# Patient Record
Sex: MALE | Race: BLACK OR AFRICAN AMERICAN | Employment: FULL TIME | URBAN - METROPOLITAN AREA
[De-identification: names, ages, dates, MRNs, and addresses within clinical notes are randomized per-mention and may not be internally consistent; named-entity substitution may affect disease eponyms.]

---

## 2017-04-11 ENCOUNTER — HOSPITAL ENCOUNTER (EMERGENCY)
Age: 42
Discharge: HOME | End: 2017-04-11
Attending: FAMILY MEDICINE

## 2017-04-11 VITALS
TEMPERATURE: 98 F | OXYGEN SATURATION: 99 % | HEART RATE: 67 BPM | BODY MASS INDEX: 21.03 KG/M2 | RESPIRATION RATE: 16 BRPM | SYSTOLIC BLOOD PRESSURE: 138 MMHG | WEIGHT: 134 LBS | DIASTOLIC BLOOD PRESSURE: 74 MMHG | HEIGHT: 67 IN

## 2017-04-11 DIAGNOSIS — T14.8XXA NONHEALING NONSURGICAL WOUND: Primary | ICD-10-CM

## 2017-04-11 RX ORDER — MUPIROCIN 20 MG/G
OINTMENT TOPICAL 2 TIMES DAILY
Qty: 22 G | Refills: 0 | Status: SHIPPED | OUTPATIENT
Start: 2017-04-11 | End: 2018-10-16

## 2017-04-11 RX ORDER — CEPHALEXIN 500 MG/1
500 CAPSULE ORAL 4 TIMES DAILY
Qty: 28 CAP | Refills: 0 | Status: SHIPPED | OUTPATIENT
Start: 2017-04-11 | End: 2017-04-18

## 2017-04-11 NOTE — UC PROVIDER NOTE
HPI Comments: 43 yo male presents today with a nonhealing wound possibly from a insect bite. No hx of vascular disease or SCA. NO fever or chills. Patient is a 42 y.o. male presenting with Insect Bite. The history is provided by the patient. No  was used. Insect Bite   This is a new problem. Episode onset: 1 month. The problem occurs constantly. The problem has not changed since onset. Pertinent negatives include no chest pain, no abdominal pain, no headaches and no shortness of breath. Nothing aggravates the symptoms. Nothing relieves the symptoms. Treatments tried: Soap and water. The treatment provided no relief. Past Medical History:   Diagnosis Date    Bronchitis         History reviewed. No pertinent surgical history. History reviewed. No pertinent family history. Social History     Social History    Marital status: SINGLE     Spouse name: N/A    Number of children: N/A    Years of education: N/A     Occupational History    Not on file. Social History Main Topics    Smoking status: Current Every Day Smoker    Smokeless tobacco: Not on file      Comment: smokes cigars maybe one or two aday    Alcohol use Yes      Comment: occ    Drug use: No    Sexual activity: Not on file     Other Topics Concern    Not on file     Social History Narrative                ALLERGIES: Review of patient's allergies indicates no known allergies. Review of Systems   Constitutional: Negative. HENT: Negative. Eyes: Negative. Respiratory: Negative. Negative for shortness of breath. Cardiovascular: Negative. Negative for chest pain. Gastrointestinal: Negative. Negative for abdominal pain. Endocrine: Negative. Genitourinary: Negative. Musculoskeletal: Negative. Skin: Positive for wound. Allergic/Immunologic: Negative. Neurological: Negative. Negative for headaches. Hematological: Negative. Psychiatric/Behavioral: Negative.         Vitals: 04/11/17 0834   BP: 138/74   Pulse: 67   Resp: 16   Temp: 98 °F (36.7 °C)   SpO2: 99%   Weight: 60.8 kg (134 lb)   Height: 5' 7\" (1.702 m)       Physical Exam   Constitutional: He is oriented to person, place, and time. He appears well-developed and well-nourished. HENT:   Head: Normocephalic and atraumatic. Right Ear: External ear normal.   Left Ear: External ear normal.   Nose: Nose normal.   Mouth/Throat: Oropharynx is clear and moist.   Cardiovascular: Normal rate, regular rhythm and normal heart sounds. Pulmonary/Chest: Effort normal and breath sounds normal.   Musculoskeletal: Normal range of motion. Neurological: He is alert and oriented to person, place, and time. Skin: Skin is warm and dry. Nonhealing appearing wound to right medial lower leg. Brown crusty appearance. No altaf wound erythema or drainage  2+ DP/PT pulses   Psychiatric: He has a normal mood and affect. His behavior is normal. Judgment and thought content normal.   Nursing note and vitals reviewed. MDM     Differential Diagnosis; Clinical Impression; Plan:     CLINICAL IMPRESSION:  Nonhealing nonsurgical wound  (primary encounter diagnosis)    Plan:  1. Non healing wound to right lower leg. 2. Wash with soap and water daily- Dial. Apply thin layer of Bactroban Ointment twice a day for 7 days. Stop scratching as this makes it much worse. 3. Follow up with a PCP if no improvement. Risk of Significant Complications, Morbidity, and/or Mortality:   Presenting problems: Moderate  Diagnostic procedures:   Moderate  Progress:   Patient progress:  Stable      Procedures

## 2017-05-01 ENCOUNTER — OFFICE VISIT (OUTPATIENT)
Dept: INTERNAL MEDICINE CLINIC | Age: 42
End: 2017-05-01

## 2017-05-01 VITALS
HEART RATE: 61 BPM | WEIGHT: 135.9 LBS | SYSTOLIC BLOOD PRESSURE: 127 MMHG | OXYGEN SATURATION: 96 % | TEMPERATURE: 98.6 F | HEIGHT: 70 IN | DIASTOLIC BLOOD PRESSURE: 84 MMHG | RESPIRATION RATE: 18 BRPM | BODY MASS INDEX: 19.45 KG/M2

## 2017-05-01 DIAGNOSIS — Z72.0 TOBACCO ABUSE: ICD-10-CM

## 2017-05-01 DIAGNOSIS — L70.0 BLACKHEAD: ICD-10-CM

## 2017-05-01 DIAGNOSIS — R59.1 LYMPHADENOPATHY: ICD-10-CM

## 2017-05-01 DIAGNOSIS — R23.4 ESCHAR: ICD-10-CM

## 2017-05-01 DIAGNOSIS — Z00.00 PHYSICAL EXAM: Primary | ICD-10-CM

## 2017-05-01 NOTE — MR AVS SNAPSHOT
Visit Information Date & Time Provider Department Dept. Phone Encounter #  
 5/1/2017 11:00 AM Ruchi Gonzalez MD [facility] [phone] Follow-up Instructions Return in about 4 weeks (around 5/29/2017). Upcoming Health Maintenance Date Due INFLUENZA AGE 9 TO ADULT 8/1/2017 DTaP/Tdap/Td series (2 - Td) 5/1/2027 Allergies as of 5/1/2017  Review Complete On: 5/1/2017 By: Dennie Sermon No Known Allergies Current Immunizations  Never Reviewed No immunizations on file. Not reviewed this visit You Were Diagnosed With   
  
 Codes Comments Physical exam    -  Primary ICD-10-CM: Z00.00 ICD-9-CM: V70.9 Blackhead     ICD-10-CM: L70.0 ICD-9-CM: 706.1 Eschar     ICD-10-CM: R23.4 ICD-9-CM: 782.8 Lymphadenopathy     ICD-10-CM: R59.1 ICD-9-CM: 785.6 Tobacco abuse     ICD-10-CM: Z72.0 ICD-9-CM: 305.1 Vitals BP Pulse Temp Resp Height(growth percentile) Weight(growth percentile) 127/84 (BP 1 Location: Left arm, BP Patient Position: Sitting) 61 98.6 °F (37 °C) (Oral) 18 5' 10.08\" (1.78 m) 135 lb 14.4 oz (61.6 kg) SpO2 BMI Smoking Status 96% 19.46 kg/m2 Current Every Day Smoker BMI and BSA Data Body Mass Index Body Surface Area  
 19.46 kg/m 2 1.75 m 2 Preferred Pharmacy Pharmacy Name Phone [id number] [place], [address] [facility] 1 [phone] Your Updated Medication List  
  
   
This list is accurate as of: 5/1/17 12:51 PM.  Always use your most recent med list.  
  
  
  
  
 albuterol 90 mcg/actuation inhaler Commonly known as:  PROVENTIL HFA, VENTOLIN HFA, PROAIR HFA Take 2 Puffs by inhalation every four (4) hours as needed for Wheezing. mupirocin 2 % ointment Commonly known as:  [facility] Apply  to affected area two (2) times a day. Apply to area for 7 days We Performed the Following CBC WITH AUTOMATED DIFF [36972 CPT(R)] METABOLIC PANEL, COMPREHENSIVE [88473 CPT(R)] URINALYSIS W/ RFLX MICROSCOPIC [19543 CPT(R)] Follow-up Instructions Return in about 4 weeks (around 5/29/2017). Introducing [facility]! [employer] introduces [employer] patient portal. Now you can access parts of your medical record, email your doctor's office, and request medication refills online. 1. In your internet browser, go to [email or web address] 2. Click on the First Time User? Click Here link in the Sign In box. You will see the New Member Sign Up page. 3. Enter your [employer] Access Code exactly as it appears below. You will not need to use this code after youve completed the sign-up process. If you do not sign up before the expiration date, you must request a new code. · [employer] Access Code: [email or web address] Expires: 7/10/2017  8:22 AM 
 
4. Enter the last four digits of your Social Security Number (xxxx) and Date of Birth (mm/dd/yyyy) as indicated and click Submit. You will be taken to the next sign-up page. 5. Create a En[employer] ID. This will be your [employer] login ID and cannot be changed, so think of one that is secure and easy to remember. 6. Create a [employer] password. You can change your password at any time. 7. Enter your Password Reset Question and Answer. This can be used at a later time if you forget your password. 8. Enter your e-mail address. You will receive e-mail notification when new information is available in [address] 9. Click Sign Up. You can now view and download portions of your medical record. 10. Click the Download Summary menu link to download a portable copy of your medical information. If you have questions, please visit the Frequently Asked Questions section of the [employer] website. Remember, [employer] is NOT to be used for urgent needs. For medical emergencies, dial 911. Now available from your iPhone and Android! Please provide this summary of care documentation to your next provider. Your primary care clinician is listed as NONE. If you have any questions after today's visit, please call [phone].

## 2017-05-01 NOTE — PROGRESS NOTES
[address] [place] [address] and Primary Care  [place] [address]  [address] [place] [address]  Phone:  [phone]  Fax: [phone]       Chief Complaint   Patient presents with    New Patient     est. PCP   . SUBJECTIVE:    Juventino Urrutia is a 42 y.o. male comes in for a physical examination. He is concerned about a rash on his right foot. Apparently he felt he was bitten by some type of insect three months ago and went to the emergency room where he was given antibiotics and a topical preparation. The lesion has pretty much healed although it itches on occasion. He has a lesion on his right clavicle he is concerned about. There has been no increase in size. His weight has been quite stable. Unfortunately he smokes tobacco.             Current Outpatient Prescriptions   Medication Sig Dispense Refill    mupirocin (BACTROBAN) 2 % ointment Apply  to affected area two (2) times a day. Apply to area for 7 days 22 g 0    albuterol (PROVENTIL HFA, VENTOLIN HFA) 90 mcg/actuation inhaler Take 2 Puffs by inhalation every four (4) hours as needed for Wheezing. 1 Inhaler 0    ciprofloxacin HCl (CIPRO) 500 mg tablet Take 1 tab twice a day for 14 days 20 Tab 0     Past Medical History:   Diagnosis Date    Bronchitis      History reviewed. No pertinent surgical history.   No Known Allergies      REVIEW OF SYSTEMS:  General: negative for - chills or fever  ENT: negative for - headaches, nasal congestion or tinnitus  Respiratory: negative for - cough, hemoptysis, shortness of breath or wheezing  Cardiovascular : negative for - chest pain, edema, palpitations or shortness of breath  Gastrointestinal: negative for - abdominal pain, blood in stools, heartburn or nausea/vomiting  Genito-Urinary: no dysuria, trouble voiding, or hematuria  Musculoskeletal: negative for - gait disturbance, joint pain, joint stiffness or joint swelling  Neurological: no TIA or stroke symptoms  Hematologic: no bruises, no bleeding, no swollen glands  Integument: no lumps, mole changes, nail changes or rash  Endocrine: no malaise/lethargy or unexpected weight changes      Social History     Social History    Marital status: UNKNOWN     Spouse name: N/A    Number of children: N/A    Years of education: N/A     Social History Main Topics    Smoking status: Current Every Day Smoker    Smokeless tobacco: Never Used      Comment: smokes cigars maybe one or two aday    Alcohol use Yes      Comment: occ    Drug use: No    Sexual activity: Not Asked     Other Topics Concern    None     Social History Narrative     Family History   Problem Relation Age of Onset    Hypertension Mother        OBJECTIVE:    Visit Vitals    /84 (BP 1 Location: Left arm, BP Patient Position: Sitting)    Pulse 61    Temp 98.6 °F (37 °C) (Oral)    Resp 18    Ht 5' 10.08\" (1.78 m)    Wt 135 lb 14.4 oz (61.6 kg)    SpO2 96%    BMI 19.46 kg/m2     CONSTITUTIONAL: well , well nourished, appears age appropriate  EYES: perrla, eom intact  ENMT:moist mucous membranes, pharynx clear  NECK: supple. Thyroid normal  RESPIRATORY: Chest: clear to ascultation and percussion   CARDIOVASCULAR: Heart: regular rate and rhythm  GASTROINTESTINAL: Abdomen: soft, bowel sounds active  HEMATOLOGIC: Shotty lymph nodes cervical region only  MUSCULOSKELETAL: Extremities: no edema, pulse 1+   INTEGUMENT: No unusual rashes or suspicious skin lesions noted. Nails appear normal.  Blackhead back  NEUROLOGIC: non-focal exam   MENTAL STATUS: alert and oriented, appropriate affect      ASSESSMENT:  1. Physical exam    2. Blackhead    3. Eschar    4. Lymphadenopathy    5. Tobacco abuse        PLAN:    1. The lesion on his right ankle is an eschar at this point. Nothing need be done. He was told not to apply any topical preparation to this. 2. The lesion on his right clavicle is related to a blackhead. Nothing need be done with this.     3. He has multiple shoddy lymph nodes in the neck region along with one in both axilla. There was no significant inguinal lymphadenopathy. The lesions do not appear to be pathological from a topical standpoint. I will have him come back in a month for follow-up to assess the hopeful involution of the lymph nodes. 4. I encourage the patient to discontinue cigarette smoking. Follow-up Disposition:  Return in about 4 weeks (around 5/29/2017).       Gemma Link MD

## 2017-05-01 NOTE — PROGRESS NOTES
Chief Complaint   Patient presents with    New Patient     est. PCP   1. Have you been to the ER, urgent care clinic since your last visit? Hospitalized since your last visit? Yes Reason for visit: insect bite    2. Have you seen or consulted any other health care providers outside of the [address] [place] [address] since your last visit? Include any pap smears or colon screening.  No

## 2017-05-02 LAB
ALBUMIN SERPL-MCNC: 4.3 G/DL (ref 3.5–5.5)
ALBUMIN/GLOB SERPL: 2.2 {RATIO} (ref 1.2–2.2)
ALP SERPL-CCNC: 83 IU/L (ref 39–117)
ALT SERPL-CCNC: 18 IU/L (ref 0–44)
APPEARANCE UR: CLEAR
AST SERPL-CCNC: 29 IU/L (ref 0–40)
BACTERIA #/AREA URNS HPF: ABNORMAL /[HPF]
BASOPHILS # BLD AUTO: 0 X10E3/UL (ref 0–0.2)
BASOPHILS NFR BLD AUTO: 0 %
BILIRUB SERPL-MCNC: 0.3 MG/DL (ref 0–1.2)
BILIRUB UR QL STRIP: NEGATIVE
BUN SERPL-MCNC: 8 MG/DL (ref 6–24)
BUN/CREAT SERPL: 8 (ref 9–20)
CALCIUM SERPL-MCNC: 9.1 MG/DL (ref 8.7–10.2)
CASTS URNS QL MICRO: ABNORMAL /LPF
CHLORIDE SERPL-SCNC: 102 MMOL/L (ref 96–106)
CO2 SERPL-SCNC: 23 MMOL/L (ref 18–29)
COLOR UR: YELLOW
CREAT SERPL-MCNC: 0.97 MG/DL (ref 0.76–1.27)
EOSINOPHIL # BLD AUTO: 0.1 X10E3/UL (ref 0–0.4)
EOSINOPHIL NFR BLD AUTO: 1 %
EPI CELLS #/AREA URNS HPF: ABNORMAL /HPF
ERYTHROCYTE [DISTWIDTH] IN BLOOD BY AUTOMATED COUNT: 14 % (ref 12.3–15.4)
GLOBULIN SER CALC-MCNC: 2 G/DL (ref 1.5–4.5)
GLUCOSE SERPL-MCNC: 58 MG/DL (ref 65–99)
GLUCOSE UR QL: NEGATIVE
HCT VFR BLD AUTO: 43.8 % (ref 37.5–51)
HGB BLD-MCNC: 14.6 G/DL (ref 12.6–17.7)
HGB UR QL STRIP: ABNORMAL
IMM GRANULOCYTES # BLD: 0 X10E3/UL (ref 0–0.1)
IMM GRANULOCYTES NFR BLD: 0 %
KETONES UR QL STRIP: NEGATIVE
LEUKOCYTE ESTERASE UR QL STRIP: NEGATIVE
LYMPHOCYTES # BLD AUTO: 3 X10E3/UL (ref 0.7–3.1)
LYMPHOCYTES NFR BLD AUTO: 39 %
MCH RBC QN AUTO: 31.5 PG (ref 26.6–33)
MCHC RBC AUTO-ENTMCNC: 33.3 G/DL (ref 31.5–35.7)
MCV RBC AUTO: 95 FL (ref 79–97)
MICRO URNS: ABNORMAL
MONOCYTES # BLD AUTO: 0.9 X10E3/UL (ref 0.1–0.9)
MONOCYTES NFR BLD AUTO: 11 %
MUCOUS THREADS URNS QL MICRO: PRESENT
NEUTROPHILS # BLD AUTO: 3.7 X10E3/UL (ref 1.4–7)
NEUTROPHILS NFR BLD AUTO: 49 %
NITRITE UR QL STRIP: NEGATIVE
PH UR STRIP: 6 [PH] (ref 5–7.5)
PLATELET # BLD AUTO: 232 X10E3/UL (ref 150–379)
POTASSIUM SERPL-SCNC: 3.9 MMOL/L (ref 3.5–5.2)
PROT SERPL-MCNC: 6.3 G/DL (ref 6–8.5)
PROT UR QL STRIP: NEGATIVE
RBC # BLD AUTO: 4.63 X10E6/UL (ref 4.14–5.8)
RBC #/AREA URNS HPF: ABNORMAL /HPF
SODIUM SERPL-SCNC: 142 MMOL/L (ref 134–144)
SP GR UR: 1.02 (ref 1–1.03)
UROBILINOGEN UR STRIP-MCNC: 0.2 MG/DL (ref 0.2–1)
WBC # BLD AUTO: 7.7 X10E3/UL (ref 3.4–10.8)
WBC #/AREA URNS HPF: ABNORMAL /HPF

## 2017-05-03 NOTE — TELEPHONE ENCOUNTER
Patient notified of urinary tract  And per Dr. Don Breen patient to take cipro 500mg 1 tab bid x 10 days. then repeat the urine after completing the meds.

## 2017-05-04 RX ORDER — CIPROFLOXACIN 500 MG/1
TABLET ORAL
Qty: 20 TAB | Refills: 0 | Status: SHIPPED | OUTPATIENT
Start: 2017-05-04 | End: 2017-06-05 | Stop reason: ALTCHOICE

## 2017-05-09 ENCOUNTER — HOSPITAL ENCOUNTER (EMERGENCY)
Age: 42
Discharge: HOME | End: 2017-05-09
Attending: EMERGENCY MEDICINE

## 2017-05-09 ENCOUNTER — HOSPITAL ENCOUNTER (OUTPATIENT)
Dept: LAB | Age: 42
Discharge: HOME | End: 2017-05-09

## 2017-05-09 VITALS
BODY MASS INDEX: 19.47 KG/M2 | RESPIRATION RATE: 16 BRPM | HEIGHT: 70 IN | DIASTOLIC BLOOD PRESSURE: 83 MMHG | OXYGEN SATURATION: 98 % | HEART RATE: 74 BPM | TEMPERATURE: 98.7 F | SYSTOLIC BLOOD PRESSURE: 135 MMHG | WEIGHT: 136 LBS

## 2017-05-09 DIAGNOSIS — T14.8XXA BLISTER: Primary | ICD-10-CM

## 2017-05-09 PROCEDURE — 87205 SMEAR GRAM STAIN: CPT

## 2017-05-09 RX ORDER — HYDROXYZINE 50 MG/1
50 TABLET, FILM COATED ORAL
Qty: 20 TAB | Refills: 0 | Status: SHIPPED | OUTPATIENT
Start: 2017-05-09 | End: 2017-05-19

## 2017-05-09 NOTE — UC PROVIDER NOTE
Patient is a 42 y.o. male presenting with wound check. The history is provided by the patient. No  was used. Wound Check    This is a new problem. The current episode started 2 days ago. The problem occurs constantly. The problem has not changed since onset. Pain location: RLE  The quality of the pain is described as aching. The pain is at a severity of 2/10. The pain is mild. Associated symptoms include itching. The symptoms are aggravated by palpation. He has tried nothing for the symptoms. The treatment provided no relief. There has been no history of extremity trauma. Past Medical History:   Diagnosis Date    Bronchitis         No past surgical history on file. Family History   Problem Relation Age of Onset    Hypertension Mother         Social History     Social History    Marital status: UNKNOWN     Spouse name: N/A    Number of children: N/A    Years of education: N/A     Occupational History    Not on file. Social History Main Topics    Smoking status: Current Every Day Smoker    Smokeless tobacco: Never Used      Comment: smokes cigars maybe one or two aday    Alcohol use Yes      Comment: occ    Drug use: No    Sexual activity: Not on file     Other Topics Concern    Not on file     Social History Narrative                ALLERGIES: Review of patient's allergies indicates no known allergies. Review of Systems   Constitutional: Negative. HENT: Negative. Eyes: Negative. Respiratory: Negative. Cardiovascular: Negative. Gastrointestinal: Negative. Endocrine: Negative. Genitourinary: Negative. Musculoskeletal: Negative. Skin: Positive for itching. Blister to right lower leg. Allergic/Immunologic: Negative. Neurological: Negative. Hematological: Negative. Psychiatric/Behavioral: Negative.         Vitals:    05/09/17 0834   BP: 135/83   Pulse: 74   Resp: 16   Temp: 98.7 °F (37.1 °C)   SpO2: 98%   Weight: 61.7 kg (136 lb)   Height: 5' 10\" (1.778 m)       Physical Exam   Constitutional: He is oriented to person, place, and time. He appears well-developed and well-nourished. HENT:   Head: Normocephalic and atraumatic. Right Ear: External ear normal.   Left Ear: External ear normal.   Nose: Nose normal.   Mouth/Throat: Oropharynx is clear and moist.   Eyes: Conjunctivae and EOM are normal. Pupils are equal, round, and reactive to light. Neck: Normal range of motion. Neck supple. Cardiovascular: Normal rate, regular rhythm and normal heart sounds. Pulmonary/Chest: Effort normal and breath sounds normal.   Musculoskeletal: Normal range of motion. Neurological: He is alert and oriented to person, place, and time. Skin: Skin is warm. No erythema. Right medial lower leg with single blister   No erythema   Non tender to touch   Psychiatric: He has a normal mood and affect. His behavior is normal. Thought content normal.   Nursing note and vitals reviewed. MDM     Differential Diagnosis; Clinical Impression; Plan:     CLINICAL IMPRESSION:  Blister  (primary encounter diagnosis)    Plan:  1. Blister to RLE. Wash with soap and water. Apply Bactroban twice a day for 5 days. 2. You will need to follow up with Dr Lidya Negron. Call and make an appointment. Consider follow up with a dermatologist.   3.   Risk of Significant Complications, Morbidity, and/or Mortality:   Presenting problems: Moderate  Diagnostic procedures:   Moderate  Progress:   Patient progress:  Stable      Procedures

## 2017-05-12 LAB
BACTERIA SPEC CULT: NORMAL
GRAM STN SPEC: NORMAL
GRAM STN SPEC: NORMAL
SERVICE CMNT-IMP: NORMAL

## 2017-05-15 ENCOUNTER — OFFICE VISIT (OUTPATIENT)
Dept: INTERNAL MEDICINE CLINIC | Age: 42
End: 2017-05-15

## 2017-05-15 VITALS
BODY MASS INDEX: 19.23 KG/M2 | SYSTOLIC BLOOD PRESSURE: 127 MMHG | HEIGHT: 70 IN | OXYGEN SATURATION: 96 % | DIASTOLIC BLOOD PRESSURE: 80 MMHG | WEIGHT: 134.3 LBS | HEART RATE: 60 BPM | RESPIRATION RATE: 18 BRPM | TEMPERATURE: 98.2 F

## 2017-05-15 DIAGNOSIS — L13.9 BULLOUS DERMATITIS: Primary | ICD-10-CM

## 2017-05-15 NOTE — PROGRESS NOTES
1. The bullous has resolved. I think this is idiopathic origin. It is getting better so nothing more need be done. If the culture is negative then he just needs to dress it on a regular basis until it epithelizes at which point he can discontinue the dressing. 2. He will keep his old appointment in the next two weeks for assessment of his lymphadenopathy.

## 2017-05-15 NOTE — MR AVS SNAPSHOT
Visit Information Date & Time Provider Department Dept. Phone Encounter #  
 5/15/2017 12:00 PM MD [facility] and [place] [address] [id number] Your Appointments 6/5/2017  9:30 AM  
Any with Melody Tucker MD  
[address] [place] [address] and Primary Care [address] [facility]) Appt Note: 1 month f/u  
 [address] [facility]  
  
   
 [address] [id number] Upcoming Health Maintenance Date Due INFLUENZA AGE 9 TO ADULT 8/1/2017 DTaP/Tdap/Td series (2 - Td) 5/1/2027 Allergies as of 5/15/2017  Review Complete On: 5/9/2017 By: Kaveh Seaman NP No Known Allergies Current Immunizations  Never Reviewed No immunizations on file. Not reviewed this visit Vitals Smoking Status Current Every Day Smoker Preferred Pharmacy Pharmacy Name Phone [id number], [address] [facility] 3 [phone] Your Updated Medication List  
  
   
This list is accurate as of: 5/15/17 12:54 PM.  Always use your most recent med list.  
  
  
  
  
 albuterol 90 mcg/actuation inhaler Commonly known as:  PROVENTIL HFA, VENTOLIN HFA, PROAIR HFA Take 2 Puffs by inhalation every four (4) hours as needed for Wheezing. ciprofloxacin HCl 500 mg tablet Commonly known as:  CIPRO Take 1 tab twice a day for 14 days  
  
 hydrOXYzine HCl 50 mg tablet Commonly known as:  ATARAX Take 1 Tab by mouth every six (6) hours as needed for Itching for up to 10 days. Indications: PRURITUS OF SKIN  
  
 mupirocin 2 % ointment Commonly known as:  [facility] Apply  to affected area two (2) times a day. Apply to area for 7 days Introducing [facility]!    
 [employer] introduces [employer] patient portal. Now you can access parts of your medical record, email your doctor's office, and request medication refills online. 1. In your internet browser, go to [email or web address] 2. Click on the First Time User? Click Here link in the Sign In box. You will see the New Member Sign Up page. 3. Enter your Ref[employer]ktion Access Code exactly as it appears below. You will not need to use this code after youve completed the sign-up process. If you do not sign up before the expiration date, you must request a new code. · [employer] Access Code: [email or web address] Expires: 7/10/2017  8:22 AM 
 
4. Enter the last four digits of your Social Security Number (xxxx) and Date of Birth (mm/dd/yyyy) as indicated and click Submit. You will be taken to the next sign-up page. 5. Create a Ref[employer]ktion ID. This will be your [employer] login ID and cannot be changed, so think of one that is secure and easy to remember. 6. Create a [employer]ktion password. You can change your password at any time. 7. Enter your Password Reset Question and Answer. This can be used at a later time if you forget your password. 8. Enter your e-mail address. You will receive e-mail notification when new information is available in [address] 9. Click Sign Up. You can now view and download portions of your medical record. 10. Click the Download Summary menu link to download a portable copy of your medical information. If you have questions, please visit the Frequently Asked Questions section of the [employer] website. Remember, [employer] is NOT to be used for urgent needs. For medical emergencies, dial 911. Now available from your iPhone and Android! Please provide this summary of care documentation to your next provider. Your primary care clinician is listed as NONE. If you have any questions after today's visit, please call [phone].

## 2017-05-15 NOTE — PROGRESS NOTES
[facility]  [address] [place] [address]  Phone:  [phone]  Fax: [phone]           Chief Complaint   Patient presents with   [facility] ED Follow-up     insect bite on leg that began to blister. .     SUBJECTIVE:    Daren Chavez is a 42 y.o. male comes in complaining of a lesion on his right foot medial aspect. It apparently started as a blister/bollous. 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 It was cultured and the culture was fortunately negative. He is doing much better now. Current Outpatient Prescriptions   Medication Sig Dispense Refill    ciprofloxacin HCl (CIPRO) 500 mg tablet Take 1 tab twice a day for 14 days 20 Tab 0    mupirocin (BACTROBAN) 2 % ointment Apply  to affected area two (2) times a day. Apply to area for 7 days 22 g 0    albuterol (PROVENTIL HFA, VENTOLIN HFA) 90 mcg/actuation inhaler Take 2 Puffs by inhalation every four (4) hours as needed for Wheezing. 1 Inhaler 0     Past Medical History:   Diagnosis Date    Bronchitis      History reviewed. No pertinent surgical history. No Known Allergies        OBJECTIVE:  Visit Vitals    /80 (BP 1 Location: Left arm, BP Patient Position: Sitting)    Pulse 60    Temp 98.2 °F (36.8 °C) (Oral)    Resp 18    Ht 5' 10\" (1.778 m)    Wt 134 lb 4.8 oz (60.9 kg)    SpO2 96%    BMI 19.27 kg/m2     ENT: perrla,  eom intact  NECK: supple. Thyroid normal  CHEST: clear to ascultation and percussion   HEART: regular rate and rhythm  Foot: Superficial ulcer medial aspect right foot, no erythema or drainage    Assessment:  1. Bullous dermatitis        Plan:    1. The patient has a resolving bullous lesion. The ulcer is healing nicely. There is no gross evidence of secondary infection. Follow-up Disposition:  Return keep old apt.       Dasha Moffett MD

## 2017-05-15 NOTE — PROGRESS NOTES
Chief Complaint   Patient presents with   [facility] Follow-up     insect bite on leg that began to blister. 1. Have you been to the ER, urgent care clinic since your last visit? Hospitalized since your last visit? Yes Reason for visit: blistered insect bite    2. Have you seen or consulted any other health care providers outside of the [address] [place] [address] since your last visit? Include any pap smears or colon screening.  No

## 2017-06-05 ENCOUNTER — OFFICE VISIT (OUTPATIENT)
Dept: INTERNAL MEDICINE CLINIC | Age: 42
End: 2017-06-05

## 2017-06-05 VITALS
HEIGHT: 70 IN | SYSTOLIC BLOOD PRESSURE: 135 MMHG | TEMPERATURE: 98.3 F | DIASTOLIC BLOOD PRESSURE: 84 MMHG | RESPIRATION RATE: 16 BRPM | HEART RATE: 68 BPM | BODY MASS INDEX: 19.88 KG/M2 | WEIGHT: 138.9 LBS | OXYGEN SATURATION: 96 %

## 2017-06-05 DIAGNOSIS — R59.1 LYMPHADENOPATHY: ICD-10-CM

## 2017-06-05 DIAGNOSIS — L97.912 LEG ULCER, RIGHT, WITH FAT LAYER EXPOSED (HCC): Primary | ICD-10-CM

## 2017-06-05 RX ORDER — CEPHALEXIN 250 MG/1
250 CAPSULE ORAL 3 TIMES DAILY
Qty: 15 CAP | Refills: 0 | Status: SHIPPED | OUTPATIENT
Start: 2017-06-05 | End: 2017-06-10

## 2017-06-05 NOTE — MR AVS SNAPSHOT
Visit Information Date & Time Provider Department Dept. Phone Encounter #  
 6/5/2017  9:30 AM Kami Lockett MD [facility] and [address] [id number] Upcoming Health Maintenance Date Due INFLUENZA AGE 9 TO ADULT 8/1/2017 DTaP/Tdap/Td series (2 - Td) 5/1/2027 Allergies as of 6/5/2017  Review Complete On: 6/5/2017 By: Fransico Calderón No Known Allergies Current Immunizations  Never Reviewed No immunizations on file. Not reviewed this visit Vitals BP Pulse Temp Resp Height(growth percentile) Weight(growth percentile) 135/84 (BP 1 Location: Left arm, BP Patient Position: Sitting) 68 98.3 °F (36.8 °C) (Oral) 16 5' 10\" (1.778 m) 138 lb 14.4 oz (63 kg) SpO2 BMI Smoking Status 96% 19.93 kg/m2 Current Every Day Smoker Vitals History BMI and BSA Data Body Mass Index Body Surface Area  
 19.93 kg/m 2 1.76 m 2 Preferred Pharmacy Pharmacy Name Phone [id number] [place] [address] [facility] [phone] Your Updated Medication List  
  
   
This list is accurate as of: 6/5/17 10:36 AM.  Always use your most recent med list.  
  
  
  
  
 albuterol 90 mcg/actuation inhaler Commonly known as:  PROVENTIL HFA, VENTOLIN HFA, PROAIR HFA Take 2 Puffs by inhalation every four (4) hours as needed for Wheezing. mupirocin 2 % ointment Commonly known as:  [facility] Apply  to affected area two (2) times a day. Apply to area for 7 days Introducing [facility] & HEALTH SERVICES! [facility] introduces Highlighter patient portal. Now you can access parts of your medical record, email your doctor's office, and request medication refills online. 1. In your internet browser, go to [email or web address] 2. Click on the First Time User? Click Here link in the Sign In box. You will see the New Member Sign Up page. 3. Enter your LogRhythm Access Code exactly as it appears below. You will not need to use this code after youve completed the sign-up process. If you do not sign up before the expiration date, you must request a new code. · LogRhythm Access Code: [email or web address] Expires: 7/10/2017  8:22 AM 
 
4. Enter the last four digits of your Social Security Number (xxxx) and Date of Birth (mm/dd/yyyy) as indicated and click Submit. You will be taken to the next sign-up page. 5. Create a LogRhythm ID. This will be your LogRhythm login ID and cannot be changed, so think of one that is secure and easy to remember. 6. Create a LogRhythm password. You can change your password at any time. 7. Enter your Password Reset Question and Answer. This can be used at a later time if you forget your password. 8. Enter your e-mail address. You will receive e-mail notification when new information is available in [address] 9. Click Sign Up. You can now view and download portions of your medical record. 10. Click the Download Summary menu link to download a portable copy of your medical information. If you have questions, please visit the Frequently Asked Questions section of the LogRhythm website. Remember, LogRhythm is NOT to be used for urgent needs. For medical emergencies, dial 911. Now available from your iPhone and Android! Please provide this summary of care documentation to your next provider. Your primary care clinician is listed as NONE. If you have any questions after today's visit, please call [phone].

## 2017-06-05 NOTE — PROGRESS NOTES
Chief Complaint   Patient presents with    Urinary Frequency     1 month follow up    1. Have you been to the ER, urgent care clinic since your last visit? Hospitalized since your last visit? No    2. Have you seen or consulted any other health care providers outside of the [address] [place] [address] since your last visit? Include any pap smears or colon screening.  No

## 2017-06-05 NOTE — PROGRESS NOTES
Chief Complaint   Patient presents with    Urinary Frequency     1 month follow up    . SUBECTIVE:    Monique Montana is a 42 y.o. male comes in for return visit stating that an ulcer on the distal right leg has flared up again. He has actually been scratching it and I suspect he disrupted the skin and it became secondarily infected. I am however concerned that this might well represent MRSA in view of the persistence of the current problem. There was complete healing for a period of time although he is left with a rather hyperpigmented eschar. He admits that he scratches this frequently. He also comes in for follow-up of his lymphadenopathy. Current Outpatient Prescriptions   Medication Sig Dispense Refill    mupirocin (BACTROBAN) 2 % ointment Apply  to affected area two (2) times a day. Apply to area for 7 days 22 g 0    albuterol (PROVENTIL HFA, VENTOLIN HFA) 90 mcg/actuation inhaler Take 2 Puffs by inhalation every four (4) hours as needed for Wheezing. 1 Inhaler 0     Past Medical History:   Diagnosis Date    Bronchitis      History reviewed. No pertinent surgical history.   No Known Allergies    REVIEW OF SYSTEMS:  Review of Systems - Negative except   ENT ROS: negative for - headaches, hearing change, nasal congestion, oral lesions, tinnitus, visual changes or   Respiratory ROS: no cough, shortness of breath, or wheezing  Cardiovascular ROS: no chest pain or dyspnea on exertion  Gastrointestinal ROS: no abdominal pain, change in bowel habits, or black or blood  Genito-Urinary ROS: no dysuria, trouble voiding, or hematuria  Musculoskeletal ROS: negative  Neurological ROS: no TIA or stroke symptoms      Social History     Social History    Marital status: UNKNOWN     Spouse name: N/A    Number of children: N/A    Years of education: N/A     Social History Main Topics    Smoking status: Current Every Day Smoker    Smokeless tobacco: Never Used      Comment: smokes cigars maybe one or two aday    Alcohol use Yes      Comment: occ    Drug use: No    Sexual activity: Yes     Partners: Female     Other Topics Concern    None     Social History Narrative   r  Family History   Problem Relation Age of Onset    Hypertension Mother        OBJECTIVE:  Visit Vitals    /84 (BP 1 Location: Left arm, BP Patient Position: Sitting)    Pulse 68    Temp 98.3 °F (36.8 °C) (Oral)    Resp 16    Ht 5' 10\" (1.778 m)    Wt 138 lb 14.4 oz (63 kg)    SpO2 96%    BMI 19.93 kg/m2     ENT: perrla,  eom intact  NECK: supple. Thyroid normal, no JVD  CHEST: clear to ascultation and percussion   HEART: regular rate and rhythm  ABD: soft, bowel sounds active,   EXTREMITIES: no edema, pulse 1+  INTEGUMENT: clear      ASSESSMENT:  1. Leg ulcer, right, with fat layer exposed    2. Lymphadenopathy        PLAN:    1. He has a secondarily infected ulcer on the medial aspect of his right lower leg. He will dress this twice a day and apply antibacterial ointment. Culture was obtained because this might well represent MRSA. In the intervening time he will be given Keflex 250 mg t.i.d until the culture returns. 2. I did not detect any lymphadenopathy today. Follow-up Disposition:  Return in about 4 weeks (around 7/3/2017).       Portillo Sanchez MD

## 2017-06-10 LAB — BACTERIA SPEC AEROBE CULT: NORMAL

## 2017-06-26 ENCOUNTER — OFFICE VISIT (OUTPATIENT)
Dept: INTERNAL MEDICINE CLINIC | Age: 42
End: 2017-06-26

## 2017-06-26 VITALS
OXYGEN SATURATION: 97 % | HEIGHT: 70 IN | HEART RATE: 63 BPM | WEIGHT: 137.3 LBS | RESPIRATION RATE: 18 BRPM | SYSTOLIC BLOOD PRESSURE: 118 MMHG | DIASTOLIC BLOOD PRESSURE: 79 MMHG | BODY MASS INDEX: 19.66 KG/M2 | TEMPERATURE: 98.8 F

## 2017-06-26 DIAGNOSIS — Z72.0 TOBACCO ABUSE: ICD-10-CM

## 2017-06-26 DIAGNOSIS — R59.1 LYMPHADENOPATHY: ICD-10-CM

## 2017-06-26 DIAGNOSIS — R23.4 ESCHAR: Primary | ICD-10-CM

## 2017-06-26 RX ORDER — FLUOCINONIDE 0.5 MG/G
CREAM TOPICAL 2 TIMES DAILY
Qty: 30 G | Refills: 0 | Status: SHIPPED | OUTPATIENT
Start: 2017-06-26 | End: 2018-10-16

## 2017-06-26 NOTE — PROGRESS NOTES
1. Have you been to the ER, urgent care clinic since your last visit? Hospitalized since your last visit? No    2. Have you seen or consulted any other health care providers outside of the [address] [place] [address] since your last visit? Include any pap smears or colon screening.  No

## 2017-06-26 NOTE — MR AVS SNAPSHOT
Visit Information Date & Time Provider Department Dept. Phone Encounter #  
 6/26/2017  9:15 AM Clarissa Meneses [facility] and [place] [address] [id number] Upcoming Health Maintenance Date Due INFLUENZA AGE 9 TO ADULT 8/1/2017 DTaP/Tdap/Td series (2 - Td) 5/1/2027 Allergies as of 6/26/2017  Review Complete On: 6/11/2017 By: Dorothea Madrid MD  
 No Known Allergies Current Immunizations  Never Reviewed No immunizations on file. Not reviewed this visit Vitals BP Pulse Temp Resp Height(growth percentile) Weight(growth percentile) 118/79 (BP 1 Location: Left arm, BP Patient Position: Sitting) 63 98.8 °F (37.1 °C) (Oral) 18 5' 10\" (1.778 m) 137 lb 4.8 oz (62.3 kg) SpO2 BMI Smoking Status 97% 19.7 kg/m2 Current Every Day Smoker BMI and BSA Data Body Mass Index Body Surface Area 19.7 kg/m 2 1.75 m 2 Preferred Pharmacy Pharmacy Name Phone [phone] [address] [phone] Your Updated Medication List  
  
   
This list is accurate as of: 6/26/17  9:59 AM.  Always use your most recent med list.  
  
  
  
  
 albuterol 90 mcg/actuation inhaler Commonly known as:  PROVENTIL HFA, VENTOLIN HFA, PROAIR HFA Take 2 Puffs by inhalation every four (4) hours as needed for Wheezing. mupirocin 2 % ointment Commonly known as:  [facility] Apply  to affected area two (2) times a day. Apply to area for 7 days Introducing hospitals & HEALTH SERVICES! Yasmeen Roberts introduces Knome patient portal. Now you can access parts of your medical record, email your doctor's office, and request medication refills online. 1. In your internet browser, go to [email or web address] 2. Click on the First Time User? Click Here link in the Sign In box. You will see the New Member Sign Up page. 3. Enter your SnagFilms Access Code exactly as it appears below. You will not need to use this code after youve completed the sign-up process. If you do not sign up before the expiration date, you must request a new code. · SnagFilms Access Code: [email or web address] Expires: 7/10/2017  8:22 AM 
 
4. Enter the last four digits of your Social Security Number (xxxx) and Date of Birth (mm/dd/yyyy) as indicated and click Submit. You will be taken to the next sign-up page. 5. Create a SnagFilms ID. This will be your SnagFilms login ID and cannot be changed, so think of one that is secure and easy to remember. 6. Create a SnagFilms password. You can change your password at any time. 7. Enter your Password Reset Question and Answer. This can be used at a later time if you forget your password. 8. Enter your e-mail address. You will receive e-mail notification when new information is available in [address] 9. Click Sign Up. You can now view and download portions of your medical record. 10. Click the Download Summary menu link to download a portable copy of your medical information. If you have questions, please visit the Frequently Asked Questions section of the SnagFilms website. Remember, SnagFilms is NOT to be used for urgent needs. For medical emergencies, dial 911. Now available from your iPhone and Android! Please provide this summary of care documentation to your next provider. Your primary care clinician is listed as NONE. If you have any questions after today's visit, please call [phone].

## 2017-06-26 NOTE — PROGRESS NOTES
Chief Complaint   Patient presents with    Wound Check     patient states that he is following up on the wound located on his right ankle   . SUBECTIVE:    Daren Chavez is a 42 y.o. male  Dictation on: 06/26/2017 10:00 AM by: Mabel ROSALES [[id number]]       Current Outpatient Prescriptions   Medication Sig Dispense Refill    fluocinoNIDE (LIDEX) 0.05 % topical cream Apply  to affected area two (2) times a day. 30 g 0    albuterol (PROVENTIL HFA, VENTOLIN HFA) 90 mcg/actuation inhaler Take 2 Puffs by inhalation every four (4) hours as needed for Wheezing. 1 Inhaler 0    mupirocin (BACTROBAN) 2 % ointment Apply  to affected area two (2) times a day. Apply to area for 7 days 22 g 0     Past Medical History:   Diagnosis Date    Bronchitis      History reviewed. No pertinent surgical history.   No Known Allergies    REVIEW OF SYSTEMS:  Review of Systems - Negative except   ENT ROS: negative for - headaches, hearing change, nasal congestion, oral lesions, tinnitus, visual changes or   Respiratory ROS: no cough, shortness of breath, or wheezing  Cardiovascular ROS: no chest pain or dyspnea on exertion  Gastrointestinal ROS: no abdominal pain, change in bowel habits, or black or blood  Genito-Urinary ROS: no dysuria, trouble voiding, or hematuria  Musculoskeletal ROS: negative  Neurological ROS: no TIA or stroke symptoms      Social History     Social History    Marital status: UNKNOWN     Spouse name: N/A    Number of children: N/A    Years of education: N/A     Social History Main Topics    Smoking status: Current Every Day Smoker    Smokeless tobacco: Never Used      Comment: smokes cigars maybe one or two aday    Alcohol use Yes      Comment: occ    Drug use: No    Sexual activity: Yes     Partners: Female     Other Topics Concern    None     Social History Narrative   r  Family History   Problem Relation Age of Onset    Hypertension Mother        OBJECTIVE:  Visit Vitals    /79 (BP 1 Location: Left arm, BP Patient Position: Sitting)    Pulse 63    Temp 98.8 °F (37.1 °C) (Oral)    Resp 18    Ht 5' 10\" (1.778 m)    Wt 137 lb 4.8 oz (62.3 kg)    SpO2 97%    BMI 19.7 kg/m2     ENT: perrla,  eom intact  NECK: supple. Thyroid normal, no JVD, no pathological lymphadenopathy  CHEST: clear to ascultation and percussion   HEART: regular rate and rhythm  ABD: soft, bowel sounds active,   EXTREMITIES: no edema, pulse 1+  INTEGUMENT: Pigmented eschar medial aspect right foot      ASSESSMENT:  1. Eschar    2. Lymphadenopathy    3. Tobacco abuse        PLAN:    1. The lesion on his leg is completely healed. He has excellent scar tissue left, some of which is pigmented related to the irritation. I suggest that he not cover the area of eschar formation any longer. I will give him a topical preparation to use to reduce the itching and, therefore, subsequent scratching. 2. Again, there is no significant lymphadenopathy in the anterior posterior cervical chain. 3. I encouraged him to discontinue cigarette smoking. Follow-up Disposition:  Return in about 6 months (around 12/26/2017).       Geovanna Dumas MD

## 2017-07-25 ENCOUNTER — OFFICE VISIT (OUTPATIENT)
Dept: INTERNAL MEDICINE CLINIC | Age: 42
End: 2017-07-25

## 2017-07-25 VITALS
RESPIRATION RATE: 18 BRPM | TEMPERATURE: 98.1 F | HEIGHT: 70 IN | BODY MASS INDEX: 19.47 KG/M2 | OXYGEN SATURATION: 95 % | HEART RATE: 57 BPM | WEIGHT: 136 LBS | SYSTOLIC BLOOD PRESSURE: 116 MMHG | DIASTOLIC BLOOD PRESSURE: 78 MMHG

## 2017-07-25 DIAGNOSIS — L13.9 BULLOUS DERMATITIS: Primary | ICD-10-CM

## 2017-07-25 NOTE — MR AVS SNAPSHOT
Visit Information Date & Time Provider Department Dept. Phone Encounter #  
 7/25/2017  1:00 PM Halle Beltrán MD [facility] and Primary Care [phone] [id number] Your Appointments 8/7/2017  9:00 AM  
Any with Halle Beltrán MD  
[address] [place] [address] and Primary Care [facility]) Appt Note: 6 week follow up  
 [address] [facility] [address] [place]  
  
   
 [address] [facility] [id number] Upcoming Health Maintenance Date Due INFLUENZA AGE 9 TO ADULT 8/1/2017 DTaP/Tdap/Td series (2 - Td) 5/1/2027 Allergies as of 7/25/2017  Review Complete On: 7/25/2017 By: Phill Wilson No Known Allergies Current Immunizations  Never Reviewed No immunizations on file. Not reviewed this visit Vitals BP Pulse Temp Resp Height(growth percentile) Weight(growth percentile) 116/78 (BP 1 Location: Left arm, BP Patient Position: Sitting) (!) 57 98.1 °F (36.7 °C) 18 5' 10\" (1.778 m) 136 lb (61.7 kg) SpO2 BMI Smoking Status 95% 19.51 kg/m2 Current Every Day Smoker BMI and BSA Data Body Mass Index Body Surface Area  
 19.51 kg/m 2 1.75 m 2 Preferred Pharmacy Pharmacy Name Phone [id number] [place] [phone] [facility] [phone] Your Updated Medication List  
  
   
This list is accurate as of: 7/25/17  3:30 PM.  Always use your most recent med list.  
  
  
  
  
 albuterol 90 mcg/actuation inhaler Commonly known as:  PROVENTIL HFA, VENTOLIN HFA, PROAIR HFA Take 2 Puffs by inhalation every four (4) hours as needed for Wheezing. fluocinoNIDE 0.05 % topical cream  
Commonly known as:  LIDEX Apply  to affected area two (2) times a day. mupirocin 2 % ointment Commonly known as:  [facility] Apply  to affected area two (2) times a day. Apply to area for 7 days Introducing [facility] & HEALTH SERVICES! Dimitris Wallace introduces ZIO Studios patient portal. Now you can access parts of your medical record, email your doctor's office, and request medication refills online. 1. In your internet browser, go to [email or web address] 2. Click on the First Time User? Click Here link in the Sign In box. You will see the New Member Sign Up page. 3. Enter your ZIO Studios Access Code exactly as it appears below. You will not need to use this code after youve completed the sign-up process. If you do not sign up before the expiration date, you must request a new code. · ZIO Studios Access Code: [email or web address] Expires: 10/23/2017  3:30 PM 
 
4. Enter the last four digits of your Social Security Number (xxxx) and Date of Birth (mm/dd/yyyy) as indicated and click Submit. You will be taken to the next sign-up page. 5. Create a ZIO Studios ID. This will be your ZIO Studios login ID and cannot be changed, so think of one that is secure and easy to remember. 6. Create a ZIO Studios password. You can change your password at any time. 7. Enter your Password Reset Question and Answer. This can be used at a later time if you forget your password. 8. Enter your e-mail address. You will receive e-mail notification when new information is available in [address] 9. Click Sign Up. You can now view and download portions of your medical record. 10. Click the Download Summary menu link to download a portable copy of your medical information. If you have questions, please visit the Frequently Asked Questions section of the ZIO Studios website. Remember, ZIO Studios is NOT to be used for urgent needs. For medical emergencies, dial 911. Now available from your iPhone and Android! Please provide this summary of care documentation to your next provider. Your primary care clinician is listed as NONE. If you have any questions after today's visit, please call [phone].

## 2017-07-25 NOTE — PROGRESS NOTES
[facility]  [address] [place] [address]  Phone:  [phone]  Fax: [phone]           Chief Complaint   Patient presents with    Ulcer     follow up for ulcer on right    . SUBJECTIVE:    Jay Mendoza is a 42 y.o. male Comes in stating that the lesion on the medial aspect of his right ankle flared up again spontaneously. It began to drain. He comes in today for follow up. There is a mild degree of itching, but he has not been scratching it. Current Outpatient Prescriptions   Medication Sig Dispense Refill    fluocinoNIDE (LIDEX) 0.05 % topical cream Apply  to affected area two (2) times a day. 30 g 0    mupirocin (BACTROBAN) 2 % ointment Apply  to affected area two (2) times a day. Apply to area for 7 days 22 g 0    albuterol (PROVENTIL HFA, VENTOLIN HFA) 90 mcg/actuation inhaler Take 2 Puffs by inhalation every four (4) hours as needed for Wheezing. 1 Inhaler 0     Past Medical History:   Diagnosis Date    Bronchitis      History reviewed. No pertinent surgical history. No Known Allergies      OBJECTIVE:  Visit Vitals    /78 (BP 1 Location: Left arm, BP Patient Position: Sitting)    Pulse (!) 57    Temp 98.1 °F (36.7 °C)    Resp 18    Ht 5' 10\" (1.778 m)    Wt 136 lb (61.7 kg)    SpO2 95%    BMI 19.51 kg/m2     ENT: perrla,  eom intact  NECK: supple. Thyroid normal  CHEST: clear to ascultation and percussion   HEART: regular rate and rhythm  Integument: Multiple clustered bullae in area of pigmented eschar medial aspect distal right foot    Assessment:  1. Bullous dermatitis        Plan:  1. The recurring nature of the bullous dermatitis in the same areas bothersome and quite disruptive for the patient. He will be referred to a dermatologist I will start antibiotics after the culture results return. .  Orders Placed This Encounter    CULTURE, BODY FLUID W GRAM STAIN    REFERRAL TO DERMATOLOGY       Follow-up Disposition:  Return in about 4 weeks (around 8/22/2017).       Aníbal Sanders MD

## 2017-07-25 NOTE — PROGRESS NOTES
Chief Complaint   Patient presents with    Ulcer     follow up for ulcer on right      1. Have you been to the ER, urgent care clinic since your last visit? Hospitalized since your last visit? No    2. Have you seen or consulted any other health care providers outside of the [address] [place] [address] since your last visit? Include any pap smears or colon screening.  No

## 2017-07-30 LAB — BACTERIA SPEC AEROBE CULT: NORMAL

## 2018-06-02 ENCOUNTER — HOSPITAL ENCOUNTER (EMERGENCY)
Age: 43
Discharge: HOME | End: 2018-06-02
Attending: STUDENT IN AN ORGANIZED HEALTH CARE EDUCATION/TRAINING PROGRAM
Payer: COMMERCIAL

## 2018-06-02 ENCOUNTER — APPOINTMENT (OUTPATIENT)
Dept: GENERAL RADIOLOGY | Age: 43
End: 2018-06-02
Attending: EMERGENCY MEDICINE
Payer: COMMERCIAL

## 2018-06-02 VITALS
SYSTOLIC BLOOD PRESSURE: 143 MMHG | RESPIRATION RATE: 18 BRPM | TEMPERATURE: 98.4 F | WEIGHT: 137.6 LBS | BODY MASS INDEX: 21.6 KG/M2 | HEART RATE: 81 BPM | OXYGEN SATURATION: 96 % | DIASTOLIC BLOOD PRESSURE: 91 MMHG | HEIGHT: 67 IN

## 2018-06-02 DIAGNOSIS — M72.2 PLANTAR FASCIITIS: Primary | ICD-10-CM

## 2018-06-02 PROCEDURE — 73630 X-RAY EXAM OF FOOT: CPT

## 2018-06-02 PROCEDURE — 99282 EMERGENCY DEPT VISIT SF MDM: CPT

## 2018-06-02 RX ORDER — IBUPROFEN 600 MG/1
600 TABLET ORAL
Status: DISCONTINUED | OUTPATIENT
Start: 2018-06-02 | End: 2018-06-02 | Stop reason: HOSPADM

## 2018-06-02 NOTE — DISCHARGE INSTRUCTIONS
Plantar Fasciitis: Care Instructions  Your Care Instructions    Plantar fasciitis is pain and inflammation of the plantar fascia, the tissue at the bottom of your foot that connects the heel bone to the toes. The plantar fascia also supports the arch. If you strain the plantar fascia, it can develop small tears and cause heel pain when you stand or walk. Plantar fasciitis can be caused by running or other sports. It also may occur in people who are overweight or who have high arches or flat feet. You may get plantar fasciitis if you walk or stand for long periods, or have a tight Achilles tendon or calf muscles. You can improve your foot pain with rest and other care at home. It might take a few weeks to a few months for your foot to heal completely. Follow-up care is a key part of your treatment and safety. Be sure to make and go to all appointments, and call your doctor if you are having problems. It's also a good idea to know your test results and keep a list of the medicines you take. How can you care for yourself at home? · Rest your feet often. Reduce your activity to a level that lets you avoid pain. If possible, do not run or walk on hard surfaces. · Take pain medicines exactly as directed. ¨ If the doctor gave you a prescription medicine for pain, take it as prescribed. ¨ If you are not taking a prescription pain medicine, take an over-the-counter anti-inflammatory medicine for pain and swelling, such as ibuprofen (Advil, Motrin) or naproxen (Aleve). Read and follow all instructions on the label. · Use ice massage to help with pain and swelling. You can use an ice cube or an ice cup several times a day. To make an ice cup, fill a paper cup with water and freeze it. Cut off the top of the cup until a half-inch of ice shows. Hold onto the remaining paper to use the cup. Rub the ice in small circles over the area for 5 to 7 minutes.   · Contrast baths, which alternate hot and cold water, can also help reduce swelling. But because heat alone may make pain and swelling worse, end a contrast bath with a soak in cold water. · Wear a night splint if your doctor suggests it. A night splint holds your foot with the toes pointed up and the foot and ankle at a 90-degree angle. This position gives the bottom of your foot a constant, gentle stretch. · Do simple exercises such as calf stretches and towel stretches 2 to 3 times each day, especially when you first get up in the morning. These can help the plantar fascia become more flexible. They also make the muscles that support your arch stronger. Hold these stretches for 15 to 30 seconds per stretch. Repeat 2 to 4 times. ¨ Stand about 1 foot from a wall. Place the palms of both hands against the wall at chest level. Lean forward against the wall, keeping one leg with the knee straight and heel on the ground while bending the knee of the other leg. ¨ Sit down on the floor or a mat with your feet stretched in front of you. Roll up a towel lengthwise, and loop it over the ball of your foot. Holding the towel at both ends, gently pull the towel toward you to stretch your foot. · Wear shoes with good arch support. Athletic shoes or shoes with a well-cushioned sole are good choices. · Try heel cups or shoe inserts (orthotics) to help cushion your heel. You can buy these at many shoe stores. · Put on your shoes as soon as you get out of bed. Going barefoot or wearing slippers may make your pain worse. · Reach and stay at a good weight for your height. This puts less strain on your feet. When should you call for help? Call your doctor now or seek immediate medical care if:  · You have heel pain with fever, redness, or warmth in your heel. · You cannot put weight on the sore foot. Watch closely for changes in your health, and be sure to contact your doctor if:  · You have numbness or tingling in your heel. · Your heel pain lasts more than 2 weeks.   Where can you learn more? Go to http://brown-ade.info/. Curtis Self in the search box to learn more about \"Plantar Fasciitis: Care Instructions. \"  Current as of: March 21, 2017  Content Version: 11.4  © 8120-4464 [employer]. Care instructions adapted under license by [employer] (which disclaims liability or warranty for this information). If you have questions about a medical condition or this instruction, always ask your healthcare professional. [place] [address] any warranty or liability for your use of this information.

## 2018-06-02 NOTE — ED PROVIDER NOTES
HPI Comments: 36 y.o. male with past medical history significant for bronchitis who presents from home via private vehicle with chief complaint of foot pain. Pt repots getting off work yesterday morning and starting with pain to the bottom of his L foot near his 2nd toe, worse with weight bearing. Pt states the pain gradually worsened throughout the day and last night noted swelling to his L foot. Pt denies any symptoms to his R foot. Pt denies any known injury or trauma. Pt denies any ankle pain or fever. Pt denies any history of diabetes. There are no other acute medical concerns at this time. Social hx: Current smoker; Occasional EtOH use  PCP: Devorah Berger MD    Note written by Jose Blankenship, as dictated by Fatuma Julian MD 11:00 AM    The history is provided by the patient. No  was used. Past Medical History:   Diagnosis Date    Bronchitis        History reviewed. No pertinent surgical history. Family History:   Problem Relation Age of Onset    Hypertension Mother        Social History     Social History    Marital status: UNKNOWN     Spouse name: N/A    Number of children: N/A    Years of education: N/A     Occupational History    Not on file. Social History Main Topics    Smoking status: Current Every Day Smoker    Smokeless tobacco: Never Used      Comment: smokes cigars maybe one or two aday    Alcohol use Yes      Comment: occ    Drug use: No    Sexual activity: Yes     Partners: Female     Other Topics Concern    Not on file     Social History Narrative         ALLERGIES: Review of patient's allergies indicates no known allergies. Review of Systems   Constitutional: Negative for chills and fever. HENT: Negative for sore throat. Eyes: Negative for pain. Respiratory: Negative for cough and shortness of breath. Cardiovascular: Negative for chest pain. Gastrointestinal: Negative for abdominal pain and vomiting.    Genitourinary: Negative for dysuria. Musculoskeletal: Positive for joint swelling and myalgias. Skin: Negative for color change, rash and wound. Neurological: Negative for syncope and headaches. Psychiatric/Behavioral: Negative for confusion. All other systems reviewed and are negative. Vitals:    06/02/18 1046   BP: (!) 143/91   Pulse: 81   Resp: 18   Temp: 98.4 °F (36.9 °C)   SpO2: 96%   Weight: 62.4 kg (137 lb 9.6 oz)   Height: 5' 7\" (1.702 m)            Physical Exam   Constitutional: He is oriented to person, place, and time. He appears well-developed. No distress. HENT:   Head: Normocephalic and atraumatic. Eyes: Conjunctivae and EOM are normal.   Neck: Normal range of motion. Neck supple. Cardiovascular: Normal rate, regular rhythm and normal heart sounds. No murmur heard. Pulmonary/Chest: Effort normal and breath sounds normal. No respiratory distress. Abdominal: Soft. Bowel sounds are normal. He exhibits no distension. There is no tenderness. There is no rebound. Musculoskeletal: Normal range of motion. He exhibits no edema. Left foot: There is tenderness and swelling. Mild tenderness to palpation over the L forefoot, between the 2nd and 3rd digits. No wound or erythema. Minimal soft tissue swelling dorsally. Neurological: He is alert and oriented to person, place, and time. No cranial nerve deficit. He exhibits normal muscle tone. Coordination normal.   Skin: Skin is warm and dry. No erythema. Nursing note and vitals reviewed.      Note written by Jose Tai, as dictated by Anna Ramsey MD 11:00 AM    [facility]      ED Course       Procedures

## 2018-06-02 NOTE — ED NOTES
10:48 AM  I have evaluated the patient as the Provider in Triage. I have reviewed His vital signs and the triage nurse assessment. I have talked with the patient and any available family and advised that I am the provider in triage and have ordered the appropriate study to initiate their work up based on the clinical presentation during my assessment. I have advised that the patient will be accommodated in the Main ED as soon as possible. I have also requested to contact the triage nurse or myself immediately if the patient experiences any changes in their condition during this brief waiting period.   Jaime Powell NP    Reports severe left foot pain in the volar aspect of his foot just below the left 2nd toe; ? neuroma

## 2018-06-02 NOTE — LETTER
[address] 
[address] [facility] [address] [id number] 
[phone] Work/School Note Date: 6/2/2018 To Whom It May concern: 
 
Abraham Obrien was seen and treated today in the emergency room by the following provider(s): 
Attending Provider: Mey Eli MD.   
 
Abraham Obrien may return to work on 6/3/2018.  
 
Sincerely, 
 
 
 
 
Mey Eli MD

## 2018-09-10 ENCOUNTER — HOSPITAL ENCOUNTER (EMERGENCY)
Age: 43
Discharge: HOME | End: 2018-09-10
Attending: EMERGENCY MEDICINE
Payer: COMMERCIAL

## 2018-09-10 ENCOUNTER — APPOINTMENT (OUTPATIENT)
Dept: GENERAL RADIOLOGY | Age: 43
End: 2018-09-10
Attending: EMERGENCY MEDICINE
Payer: COMMERCIAL

## 2018-09-10 VITALS
SYSTOLIC BLOOD PRESSURE: 132 MMHG | RESPIRATION RATE: 16 BRPM | OXYGEN SATURATION: 98 % | HEIGHT: 66 IN | BODY MASS INDEX: 21.38 KG/M2 | HEART RATE: 58 BPM | TEMPERATURE: 98.5 F | DIASTOLIC BLOOD PRESSURE: 75 MMHG | WEIGHT: 133 LBS

## 2018-09-10 DIAGNOSIS — J20.9 ACUTE BRONCHITIS, UNSPECIFIED ORGANISM: Primary | ICD-10-CM

## 2018-09-10 DIAGNOSIS — R19.7 DIARRHEA, UNSPECIFIED TYPE: ICD-10-CM

## 2018-09-10 LAB
APPEARANCE UR: CLEAR
BACTERIA URNS QL MICRO: NEGATIVE /HPF
BILIRUB UR QL: NEGATIVE
COLOR UR: ABNORMAL
EPITH CASTS URNS QL MICRO: ABNORMAL /LPF
GLUCOSE UR STRIP.AUTO-MCNC: NEGATIVE MG/DL
HGB UR QL STRIP: ABNORMAL
HYALINE CASTS URNS QL MICRO: ABNORMAL /LPF (ref 0–5)
KETONES UR QL STRIP.AUTO: 40 MG/DL
LEUKOCYTE ESTERASE UR QL STRIP.AUTO: NEGATIVE
NITRITE UR QL STRIP.AUTO: NEGATIVE
PH UR STRIP: 5.5 [PH] (ref 5–8)
PROT UR STRIP-MCNC: ABNORMAL MG/DL
RBC #/AREA URNS HPF: ABNORMAL /HPF (ref 0–5)
SP GR UR REFRACTOMETRY: 1.03 (ref 1–1.03)
UR CULT HOLD, URHOLD: NORMAL
UROBILINOGEN UR QL STRIP.AUTO: 0.2 EU/DL (ref 0.2–1)
WBC URNS QL MICRO: ABNORMAL /HPF (ref 0–4)

## 2018-09-10 PROCEDURE — 71046 X-RAY EXAM CHEST 2 VIEWS: CPT

## 2018-09-10 PROCEDURE — 99283 EMERGENCY DEPT VISIT LOW MDM: CPT

## 2018-09-10 PROCEDURE — 81001 URINALYSIS AUTO W/SCOPE: CPT | Performed by: EMERGENCY MEDICINE

## 2018-09-10 RX ORDER — PREDNISONE 20 MG/1
40 TABLET ORAL DAILY
Qty: 10 TAB | Refills: 0 | Status: SHIPPED | OUTPATIENT
Start: 2018-09-10 | End: 2018-09-13 | Stop reason: CLARIF

## 2018-09-10 RX ORDER — AZITHROMYCIN 250 MG/1
TABLET, FILM COATED ORAL
Qty: 6 TAB | Refills: 0 | Status: SHIPPED | OUTPATIENT
Start: 2018-09-10 | End: 2018-09-15

## 2018-09-10 NOTE — ED PROVIDER NOTES
Patient is a 39 y.o. male presenting with cough, diarrhea, and flank pain. Cough Pertinent negatives include no headaches, no sore throat, no shortness of breath and no vomiting. Diarrhea Associated symptoms include diarrhea. Pertinent negatives include no vomiting, no dysuria, no headaches and no back pain. Flank Pain Pertinent negatives include no headaches, no abdominal pain and no dysuria. Pr reports a persistent harsh cough, intermittent left flank pain and episodic non bloody diarrhea for 3 days. Denies fever, cold symptoms, headache, neck pain, visual changes, focal weakness or rash. Denies any difficulty breathing, difficulty swallowing, SOB or chest pain. Denies any nausea, vomiting, urinary symptoms. Pt. Reports taking multi-symptom over the counter cough remedies without relief. He states that he coughs so much that he cannot sleep. Old charts reviewed. Past Medical History:  
Diagnosis Date  Bronchitis History reviewed. No pertinent surgical history. Family History:  
Problem Relation Age of Onset  Hypertension Mother Social History Social History  Marital status: UNKNOWN Spouse name: N/A  
 Number of children: N/A  
 Years of education: N/A Occupational History  Not on file. Social History Main Topics  Smoking status: Current Some Day Smoker  Smokeless tobacco: Never Used Comment: smokes cigars maybe one or two aday  Alcohol use Yes Comment: occ  Drug use: No  
 Sexual activity: Yes  
  Partners: Female Other Topics Concern  Not on file Social History Narrative ALLERGIES: Review of patient's allergies indicates no known allergies. Review of Systems Constitutional: Negative for activity change and appetite change. HENT: Negative for facial swelling, sore throat and trouble swallowing. Eyes: Negative. Respiratory: Positive for cough. Negative for shortness of breath. Cardiovascular: Negative. Gastrointestinal: Positive for diarrhea. Negative for abdominal pain and vomiting. Genitourinary: Positive for flank pain. Negative for dysuria. Musculoskeletal: Negative for back pain and neck pain. Skin: Negative for color change. Neurological: Negative for headaches. Psychiatric/Behavioral: Negative. Vitals:  
 09/10/18 1003 BP: 132/75 Pulse: (!) 58 Resp: 16 Temp: 98.5 °F (36.9 °C) SpO2: 98% Weight: 60.3 kg (133 lb) Height: 5' 6\" (1.676 m) Physical Exam  
Constitutional: He is oriented to person, place, and time. He appears well-nourished. Black male; works at [id number] in [email or web address]; smoker HENT:  
Head: Normocephalic. Right Ear: External ear normal.  
Left Ear: External ear normal.  
Mouth/Throat: Oropharynx is clear and moist.  
Eyes: Pupils are equal, round, and reactive to light. Neck: Normal range of motion. Neck supple. Cardiovascular: Normal rate and regular rhythm. Pulmonary/Chest: Effort normal and breath sounds normal.  
Intermittent congested cough Abdominal: Soft. Bowel sounds are normal.  
Musculoskeletal: Normal range of motion. Neurological: He is alert and oriented to person, place, and time. Skin: Skin is warm and dry. No rash noted. Nursing note and vitals reviewed. MDM 
 
 
ED Course Procedures Suspect viral illness; encourage pt to stop smoking, push fluids and rest. 
Patient's results and plan of care have been reviewed with him. Patient and/or family have verbally conveyed their understanding and agreement of the patient's signs, symptoms, diagnosis, treatment and prognosis and additionally agree to follow up as recommended or return to the Emergency Room should his condition change prior to follow-up.   Discharge instructions have also been provided to the patient with some educational information regarding his diagnosis as well a list of reasons why he would want to return to the ER prior to his  follow-up appointment should his condition change. Stefano Bronson NP

## 2018-09-10 NOTE — DISCHARGE INSTRUCTIONS
Bronchitis: Care Instructions  Your Care Instructions    Bronchitis is inflammation of the bronchial tubes, which carry air to the lungs. The tubes swell and produce mucus, or phlegm. The mucus and inflamed bronchial tubes make you cough. You may have trouble breathing. Most cases of bronchitis are caused by viruses like those that cause colds. Antibiotics usually do not help and they may be harmful. Bronchitis usually develops rapidly and lasts about 2 to 3 weeks in otherwise healthy people. Follow-up care is a key part of your treatment and safety. Be sure to make and go to all appointments, and call your doctor if you are having problems. It's also a good idea to know your test results and keep a list of the medicines you take. How can you care for yourself at home? · Take all medicines exactly as prescribed. Call your doctor if you think you are having a problem with your medicine. · Get some extra rest.  · Take an over-the-counter pain medicine, such as acetaminophen (Tylenol), ibuprofen (Advil, Motrin), or naproxen (Aleve) to reduce fever and relieve body aches. Read and follow all instructions on the label. · Do not take two or more pain medicines at the same time unless the doctor told you to. Many pain medicines have acetaminophen, which is Tylenol. Too much acetaminophen (Tylenol) can be harmful. · Take an over-the-counter cough medicine that contains dextromethorphan to help quiet a dry, hacking cough so that you can sleep. Avoid cough medicines that have more than one active ingredient. Read and follow all instructions on the label. · Breathe moist air from a humidifier, hot shower, or sink filled with hot water. The heat and moisture will thin mucus so you can cough it out. · Do not smoke. Smoking can make bronchitis worse. If you need help quitting, talk to your doctor about stop-smoking programs and medicines. These can increase your chances of quitting for good.   When should you call for help? Call 911 anytime you think you may need emergency care. For example, call if:    · You have severe trouble breathing.    Call your doctor now or seek immediate medical care if:    · You have new or worse trouble breathing.     · You cough up dark brown or bloody mucus (sputum).     · You have a new or higher fever.     · You have a new rash.    Watch closely for changes in your health, and be sure to contact your doctor if:    · You cough more deeply or more often, especially if you notice more mucus or a change in the color of your mucus.     · You are not getting better as expected. Where can you learn more? Go to http://brown-ade.info/. Enter H333 in the search box to learn more about \"Bronchitis: Care Instructions. \"  Current as of: December 6, 2017  Content Version: 11.7  © 4595-6384 [employer]. Care instructions adapted under license by [employer] (which disclaims liability or warranty for this information). If you have questions about a medical condition or this instruction, always ask your healthcare professional. [place] [address] any warranty or liability for your use of this information. Diarrhea: Care Instructions  Your Care Instructions    Diarrhea is loose, watery stools (bowel movements). The exact cause is often hard to find. Sometimes diarrhea is your body's way of getting rid of what caused an upset stomach. Viruses, food poisoning, and many medicines can cause diarrhea. Some people get diarrhea in response to emotional stress, anxiety, or certain foods. Almost everyone has diarrhea now and then. It usually isn't serious, and your stools will return to normal soon. The important thing to do is replace the fluids you have lost, so you can prevent dehydration. The doctor has checked you carefully, but problems can develop later.  If you notice any problems or new symptoms, get medical treatment right away.  Follow-up care is a key part of your treatment and safety. Be sure to make and go to all appointments, and call your doctor if you are having problems. It's also a good idea to know your test results and keep a list of the medicines you take. How can you care for yourself at home? · Watch for signs of dehydration, which means your body has lost too much water. Dehydration is a serious condition and should be treated right away. Signs of dehydration are:  ¨ Increasing thirst and dry eyes and mouth. ¨ Feeling faint or lightheaded. ¨ Darker urine, and a smaller amount of urine than normal.  · To prevent dehydration, drink plenty of fluids, enough so that your urine is light yellow or clear like water. Choose water and other caffeine-free clear liquids until you feel better. If you have kidney, heart, or liver disease and have to limit fluids, talk with your doctor before you increase the amount of fluids you drink. · Begin eating small amounts of mild foods the next day, if you feel like it. ¨ Try yogurt that has live cultures of Lactobacillus. (Check the label.)  ¨ Avoid spicy foods, fruits, alcohol, and caffeine until 48 hours after all symptoms are gone. ¨ Avoid chewing gum that contains sorbitol. ¨ Avoid dairy products (except for yogurt with Lactobacillus) while you have diarrhea and for 3 days after symptoms are gone. · The doctor may recommend that you take over-the-counter medicine, such as loperamide (Imodium), if you still have diarrhea after 6 hours. Read and follow all instructions on the label. Do not use this medicine if you have bloody diarrhea, a high fever, or other signs of serious illness. Call your doctor if you think you are having a problem with your medicine. When should you call for help? Call 911 anytime you think you may need emergency care.  For example, call if:    · You passed out (lost consciousness).     · Your stools are maroon or very bloody.    Call your doctor now or seek immediate medical care if:    · You are dizzy or lightheaded, or you feel like you may faint.     · Your stools are black and look like tar, or they have streaks of blood.     · You have new or worse belly pain.     · You have symptoms of dehydration, such as:  ¨ Dry eyes and a dry mouth. ¨ Passing only a little dark urine. ¨ Feeling thirstier than usual.     · You have a new or higher fever.    Watch closely for changes in your health, and be sure to contact your doctor if:    · Your diarrhea is getting worse.     · You see pus in the diarrhea.     · You are not getting better after 2 days (48 hours). Where can you learn more? Go to http://brown-ade.info/. Enter Z298 in the search box to learn more about \"Diarrhea: Care Instructions. \"  Current as of: November 20, 2017  Content Version: 11.7  © 0101-5447 [employer]. Care instructions adapted under license by [employer] (which disclaims liability or warranty for this information). If you have questions about a medical condition or this instruction, always ask your healthcare professional. [place] [address] any warranty or liability for your use of this information.

## 2018-09-10 NOTE — LETTER
NOTIFICATION RETURN TO WORK / SCHOOL 
 
9/10/2018 11:19 AM 
 
Mr. Nivia Mcqueen [address] [place] [phone] To Whom It May Concern: 
 
Nivia Mcqueen is currently under the care of [facility]. He will return to work/school on: 9/13/18 If there are questions or concerns please have the patient contact our office. Sincerely, Hanna Babin NP

## 2018-09-11 ENCOUNTER — PATIENT OUTREACH (OUTPATIENT)
Dept: OTHER | Age: 43
End: 2018-09-11

## 2018-09-11 NOTE — PROGRESS NOTES
HPRP progress note Patient eligible for [employer] care management Received notification of discharge from [facility] ED on 9/10/18 for Acute bronchitis, Diarrhea. Contacted patient to discuss post discharge needs and offer care management services. Two patient identifiers verified. Discussed the care management program.  Patient agrees to care management services at this time. PMH:  
Past Medical History:  
Diagnosis Date  Bronchitis Social History:  
Social History Social History  Marital status: UNKNOWN Spouse name: N/A  
 Number of children: N/A  
 Years of education: N/A Occupational History  Not on file. Social History Main Topics  Smoking status: Current Some Day Smoker  Smokeless tobacco: Never Used Comment: smokes cigars maybe one or two aday  Alcohol use Yes Comment: occ  Drug use: No  
 Sexual activity: Yes  
  Partners: Female Other Topics Concern  Not on file Social History Narrative Care management assessment completed: 
*Spoke with patient who reports that he is feeling a little better. Taking medication as directed and resting.  
  -Encouraged to drinks plenty of fluids to avoid dehydration. *Asked about his diarrhea and he states that it is intermittent. No episodes today. *Asked about PCP f/u appointment- has not called to schedule, asked if I could assist making appointment and patient agreed. 
  [facility] PCP office used identifiers. Appointment made for Thursday, Sept 13, 2018 @ 3:30p. Patient repeated back appointment information. *Patient is a sometime Cigar smoker. Encouraged patient to quit, especially since he has had several  episodes of Bronchitis. Will try to quit. Condition Focused Assessment:  
 
Respiratory Condition Focused Assessment Supplemental oxygen use? no 
Oxygen settings- N/A Cough yes - especially at night. Patient reported important numbers to know: Oxygen level 96%  9/10/18 Temperature 98.4  9/10/18 Description of any sputum Thick Yellow Pain 0 Weight 133 Lbs 9/10/18 Any change in activity level? Yes - some fatiigue Any of the following? Shortness of breath or difficulty breathing Some Dizziness/lightheadedness no Fever no Low body temperature no Chills or shaking At times - denies fever Sweating no Dyspnea on exertion no Fatigue Some Anxiety no Confusionno Unexplained change in weight loss no Sleep disturbance Yes - due to coughing    
ICS? no  
Does patient have action plan? no  
 
 
Red Flags: 
Call 911 anytime you think you may need emergency care. For example, call if: 
·You have severe trouble breathing. You passed out (lost consciousness). ·Your stools are maroon or very bloody. Call your doctor now or seek immediate medical care if: 
·You have new or worse trouble breathing. ·You cough up dark brown or bloody mucus (sputum). ·You have a new or higher fever. ·You have a new rash. You cough more deeply or more often, especially if you notice more mucus or 
a change in the color of your mucus. Your diarrhea is getting worse. ·You see pus in the diarrhea. ·You are not getting better after 2 days (48 hours). Medications: 
New Medications at Discharge: azithromycin (ZITHROMAX Z-LORI) 250 mg tablet. predniSONE (DELTASONE) 20 mg tablet,  codeine-guaifenesin  mg/5 mL liqd Changed Medications at Discharge: None Noted Discontinued Medications at Discharge: None Noted Current Outpatient Prescriptions Medication Sig  
 azithromycin (ZITHROMAX Z-LORI) 250 mg tablet Take 2 tablets today; then 1 tablet daily for the next 4 days  predniSONE (DELTASONE) 20 mg tablet Take 2 Tabs by mouth daily for 5 days. With Breakfast  
 codeine-guaifenesin  mg/5 mL liqd Take 10 mL by mouth two (2) times daily as needed. Max Daily Amount: 20 mL.  fluocinoNIDE (LIDEX) 0.05 % topical cream Apply  to affected area two (2) times a day.  mupirocin (BACTROBAN) 2 % ointment Apply  to affected area two (2) times a day. Apply to area for 7 days  albuterol (PROVENTIL HFA, VENTOLIN HFA) 90 mcg/actuation inhaler Take 2 Puffs by inhalation every four (4) hours as needed for Wheezing. No current facility-administered medications for this visit. There are no discontinued medications. Performed medication reconciliation with patient, and patient verbalizes understanding of administration of home medications. There were no barriers to obtaining medications identified at this time. Preventative Care Health Maintenance Topic Date Due  Influenza Age 5 to Adult  08/01/2018  DTaP/Tdap/Td series (2 - Td) 05/01/2027  Pneumococcal 19-64 Medium Risk  Addressed Healthy Habits Nutrition: Should eat better Movement: Active CM Identified  Problems 1. Smoking Goals Will discuss quitting smoker with PCP. Barriers/Support system: 
patient and parents Home health/DME in place per discharge orders Barriers/Challenges to Care: []  Decline in memory    []  Language barrier    
[]  Emotional                  []  Limited mobility 
[]  Lack of motivation     [] Vision, hearing or cognitive impairment []  Knowledge [] Financial Barriers []  Lack of support  []  Pain [x]  Other - Smoker []  None PCP/Specialist follow up: Patient scheduled to follow up with Jessika Arriaga MD on Thursday 9/13/2018 @ 3:30p. Marti  Future Appointments Date Time Provider Eboni Tapiai 9/13/2018 3:30 PM Jessika Arriaga MD [address] [place] [address] Reviewed red flags with patient, and patient verbalizes understanding. Patient given an opportunity to ask questions. No other clinical/social/functional needs noted.   
The patient agrees to contact the PCP office for questions related to their healthcare. The patient expressed thanks, offered no additional questions and ended the call. Plan for next call: 9/25/18 f/u Acute bronchitis, Diarrhea, PCP Appt.

## 2018-09-13 ENCOUNTER — OFFICE VISIT (OUTPATIENT)
Dept: INTERNAL MEDICINE CLINIC | Age: 43
End: 2018-09-13

## 2018-09-13 VITALS
SYSTOLIC BLOOD PRESSURE: 116 MMHG | HEIGHT: 66 IN | DIASTOLIC BLOOD PRESSURE: 75 MMHG | WEIGHT: 135.1 LBS | TEMPERATURE: 98.4 F | HEART RATE: 62 BPM | BODY MASS INDEX: 21.71 KG/M2 | RESPIRATION RATE: 14 BRPM | OXYGEN SATURATION: 95 %

## 2018-09-13 DIAGNOSIS — Z00.00 PHYSICAL EXAM: ICD-10-CM

## 2018-09-13 DIAGNOSIS — J44.9 CHRONIC OBSTRUCTIVE PULMONARY DISEASE, UNSPECIFIED COPD TYPE (HCC): ICD-10-CM

## 2018-09-13 DIAGNOSIS — Z72.0 TOBACCO ABUSE: ICD-10-CM

## 2018-09-13 DIAGNOSIS — J45.40 MODERATE PERSISTENT ASTHMATIC BRONCHITIS WITHOUT COMPLICATION: Primary | ICD-10-CM

## 2018-09-13 RX ORDER — ALBUTEROL SULFATE 90 UG/1
2 AEROSOL, METERED RESPIRATORY (INHALATION)
Qty: 1 INHALER | Refills: 11 | Status: SHIPPED | OUTPATIENT
Start: 2018-09-13 | End: 2019-10-18

## 2018-09-13 RX ORDER — PREDNISONE 20 MG/1
20 TABLET ORAL
Qty: 15 TAB | Refills: 0 | Status: SHIPPED | OUTPATIENT
Start: 2018-09-13 | End: 2018-10-16

## 2018-09-13 NOTE — MR AVS SNAPSHOT
[address] [place] [address]. [address] [id number] 
[phone] Patient: Lj Wright MRN: [id number] Visit Information Date & Time Provider Department Dept. Phone Encounter #  
 9/13/2018  3:30 PM Hyacinth Hammans, MD [facility] [place] [address] [id number] Upcoming Health Maintenance Date Due Influenza Age 5 to Adult 8/1/2018 DTaP/Tdap/Td series (2 - Td) 5/1/2027 Allergies as of 9/13/2018  Review Complete On: 9/13/2018 By: Garrett Teran No Known Allergies Current Immunizations  Never Reviewed No immunizations on file. Not reviewed this visit You Were Diagnosed With   
  
 Codes Comments Moderate persistent asthmatic bronchitis without complication    -  Primary ICD-10-CM: J45.40 ICD-9-CM: 493.90 Chronic obstructive pulmonary disease, unspecified COPD type ([address]     ICD-10-CM: J44.9 ICD-9-CM: 991 Tobacco abuse     ICD-10-CM: Z72.0 ICD-9-CM: 305.1 Physical exam     ICD-10-CM: Z00.00 ICD-9-CM: V70.9 Vitals BP Pulse Temp Resp Height(growth percentile) Weight(growth percentile) 116/75 62 98.4 °F (36.9 °C) (Oral) 14 5' 6\" (1.676 m) 135 lb 1.6 oz (61.3 kg) SpO2 BMI Smoking Status 95% 21.81 kg/m2 Current Some Day Smoker Vitals History BMI and BSA Data Body Mass Index Body Surface Area  
 21.81 kg/m 2 1.69 m 2 Preferred Pharmacy Pharmacy Name Phone [id number] [place] [id number] [address] [place] [id number] [phone] Your Updated Medication List  
  
   
This list is accurate as of 9/13/18  4:31 PM.  Always use your most recent med list.  
  
  
  
  
 albuterol 90 mcg/actuation inhaler Commonly known as:  PROVENTIL HFA, VENTOLIN HFA, PROAIR HFA Take 2 Puffs by inhalation every four (4) hours as needed for Wheezing. azithromycin 250 mg tablet Commonly known as:  Estefani Carmichaelr Take 2 tablets today; then 1 tablet daily for the next 4 days  
  
 codeine-guaifenesin  mg/5 mL Liqd Take 10 mL by mouth two (2) times daily as needed. Max Daily Amount: 20 mL. fluocinoNIDE 0.05 % topical cream  
Commonly known as:  LIDEX Apply  to affected area two (2) times a day. mupirocin 2 % ointment Commonly known as:  [employer] Apply  to affected area two (2) times a day. Apply to area for 7 days  
  
 predniSONE 20 mg tablet Commonly known as:  Orlean Aas Take 1 Tab by mouth three (3) times daily (after meals). Prescriptions Sent to Pharmacy Refills  
 albuterol (PROVENTIL HFA, VENTOLIN HFA, PROAIR HFA) 90 mcg/actuation inhaler 11 Sig: Take 2 Puffs by inhalation every four (4) hours as needed for Wheezing. Class: Normal  
 Pharmacy: [address] [address]  [email or web address] Ph #: [phone] Route: Inhalation  
 predniSONE (DELTASONE) 20 mg tablet 0 Sig: Take 1 Tab by mouth three (3) times daily (after meals). Class: Normal  
 Pharmacy: [address] [address] [facility] Ph #: [phone] Route: Oral  
  
We Performed the Following CBC WITH AUTOMATED DIFF [99414 CPT(R)] COLLECTION VENOUS BLOOD,VENIPUNCTURE B5853224 CPT(R)] LIPID PANEL [38296 CPT(R)] METABOLIC PANEL, COMPREHENSIVE [49215 CPT(R)] URINALYSIS W/ RFLX MICROSCOPIC [13172 CPT(R)] Introducing [facility]! [employer] introduces [employer] patient portal. Now you can access parts of your medical record, email your doctor's office, and request medication refills online. 1. In your internet browser, go to [email or web address] [place] 2. Click on the First Time User? Click Here link in the Sign In box. You will see the New Member Sign Up page. 3. Enter your 4[employer] Access Code exactly as it appears below.  You will not need to use this code after youve completed the sign-up process. If you do not sign up before the expiration date, you must request a new code. · [employer] Access Code: [email or web address] Expires: 12/9/2018 10:02 AM 
 
4. Enter the last four digits of your Social Security Number (xxxx) and Date of Birth (mm/dd/yyyy) as indicated and click Submit. You will be taken to the next sign-up page. 5. Create a [employer] ID. This will be your [employer] login ID and cannot be changed, so think of one that is secure and easy to remember. 6. Create a [employer] password. You can change your password at any time. 7. Enter your Password Reset Question and Answer. This can be used at a later time if you forget your password. 8. Enter your e-mail address. You will receive e-mail notification when new information is available in [address] 9. Click Sign Up. You can now view and download portions of your medical record. 10. Click the Download Summary menu link to download a portable copy of your medical information. If you have questions, please visit the Frequently Asked Questions section of the [employer] website. Remember, [employer] is NOT to be used for urgent needs. For medical emergencies, dial 911. Now available from your iPhone and Android! Please provide this summary of care documentation to your next provider. Your primary care clinician is listed as Yvonne Nicolas. If you have any questions after today's visit, please call [phone].

## 2018-09-13 NOTE — PROGRESS NOTES
Chief Complaint Patient presents with  ED Follow-up Patient seen at [facility] on 9/10/18 for cough, diarrhea, and flank pain. 1. Have you been to the ER, urgent care clinic since your last visit? Hospitalized since your last visit? Yes When: 9/10/18 Where: [facility] Reason for visit: cough, diarrhea, and flank pain 2. Have you seen or consulted any other health care providers outside of the [address] [place] [address] since your last visit? Include any pap smears or colon screening.  No

## 2018-09-14 LAB
ALBUMIN SERPL-MCNC: 4.6 G/DL (ref 3.5–5.5)
ALBUMIN/GLOB SERPL: 2 {RATIO} (ref 1.2–2.2)
ALP SERPL-CCNC: 101 IU/L (ref 39–117)
ALT SERPL-CCNC: 17 IU/L (ref 0–44)
APPEARANCE UR: CLEAR
AST SERPL-CCNC: 24 IU/L (ref 0–40)
BACTERIA #/AREA URNS HPF: NORMAL /[HPF]
BASOPHILS # BLD AUTO: 0 X10E3/UL (ref 0–0.2)
BASOPHILS NFR BLD AUTO: 0 %
BILIRUB SERPL-MCNC: 0.2 MG/DL (ref 0–1.2)
BILIRUB UR QL STRIP: NEGATIVE
BUN SERPL-MCNC: 9 MG/DL (ref 6–24)
BUN/CREAT SERPL: 11 (ref 9–20)
CALCIUM SERPL-MCNC: 9.5 MG/DL (ref 8.7–10.2)
CASTS URNS QL MICRO: NORMAL /LPF
CHLORIDE SERPL-SCNC: 98 MMOL/L (ref 96–106)
CHOLEST SERPL-MCNC: 163 MG/DL (ref 100–199)
CO2 SERPL-SCNC: 23 MMOL/L (ref 20–29)
COLOR UR: YELLOW
CREAT SERPL-MCNC: 0.81 MG/DL (ref 0.76–1.27)
EOSINOPHIL # BLD AUTO: 0 X10E3/UL (ref 0–0.4)
EOSINOPHIL NFR BLD AUTO: 0 %
EPI CELLS #/AREA URNS HPF: NORMAL /HPF
ERYTHROCYTE [DISTWIDTH] IN BLOOD BY AUTOMATED COUNT: 13.5 % (ref 12.3–15.4)
GLOBULIN SER CALC-MCNC: 2.3 G/DL (ref 1.5–4.5)
GLUCOSE SERPL-MCNC: 58 MG/DL (ref 65–99)
GLUCOSE UR QL: NEGATIVE
HCT VFR BLD AUTO: 42.6 % (ref 37.5–51)
HDLC SERPL-MCNC: 70 MG/DL
HGB BLD-MCNC: 14.3 G/DL (ref 13–17.7)
HGB UR QL STRIP: ABNORMAL
IMM GRANULOCYTES # BLD: 0 X10E3/UL (ref 0–0.1)
IMM GRANULOCYTES NFR BLD: 0 %
KETONES UR QL STRIP: NEGATIVE
LDLC SERPL CALC-MCNC: 74 MG/DL (ref 0–99)
LEUKOCYTE ESTERASE UR QL STRIP: NEGATIVE
LYMPHOCYTES # BLD AUTO: 2.4 X10E3/UL (ref 0.7–3.1)
LYMPHOCYTES NFR BLD AUTO: 22 %
MCH RBC QN AUTO: 31.5 PG (ref 26.6–33)
MCHC RBC AUTO-ENTMCNC: 33.6 G/DL (ref 31.5–35.7)
MCV RBC AUTO: 94 FL (ref 79–97)
MICRO URNS: ABNORMAL
MONOCYTES # BLD AUTO: 0.9 X10E3/UL (ref 0.1–0.9)
MONOCYTES NFR BLD AUTO: 8 %
MUCOUS THREADS URNS QL MICRO: PRESENT
NEUTROPHILS # BLD AUTO: 7.5 X10E3/UL (ref 1.4–7)
NEUTROPHILS NFR BLD AUTO: 70 %
NITRITE UR QL STRIP: NEGATIVE
PH UR STRIP: 6 [PH] (ref 5–7.5)
PLATELET # BLD AUTO: 267 X10E3/UL (ref 150–379)
POTASSIUM SERPL-SCNC: 4.2 MMOL/L (ref 3.5–5.2)
PROT SERPL-MCNC: 6.9 G/DL (ref 6–8.5)
PROT UR QL STRIP: NEGATIVE
RBC # BLD AUTO: 4.54 X10E6/UL (ref 4.14–5.8)
RBC #/AREA URNS HPF: NORMAL /HPF
SODIUM SERPL-SCNC: 139 MMOL/L (ref 134–144)
SP GR UR: 1.01 (ref 1–1.03)
TRIGL SERPL-MCNC: 94 MG/DL (ref 0–149)
UROBILINOGEN UR STRIP-MCNC: 0.2 MG/DL (ref 0.2–1)
VLDLC SERPL CALC-MCNC: 19 MG/DL (ref 5–40)
WBC # BLD AUTO: 10.9 X10E3/UL (ref 3.4–10.8)
WBC #/AREA URNS HPF: NORMAL /HPF

## 2018-09-15 DIAGNOSIS — R31.29 MICROSCOPIC HEMATURIA: Primary | ICD-10-CM

## 2018-09-16 NOTE — PROGRESS NOTES
Chief Complaint Patient presents with  ED Follow-up Patient seen at [facility] on 9/10/18 for cough, diarrhea, and flank pain. .   
 
Cameron Ledesma: 
 
Rashi Moreno is a 39 y.o. male Comes in for return visit having gone to the ER because of a 1-2 week history of persistent coughing, productive of yellowish mucus, as well as nasal congestion. He was given a short course of prednisone and bronchodilator. He continues to wheeze. He has along history of cigarette smoking. Prior to this he was asymptomatic. He's had no meaningful weight gain either. Current Outpatient Prescriptions Medication Sig Dispense Refill  albuterol (PROVENTIL HFA, VENTOLIN HFA, PROAIR HFA) 90 mcg/actuation inhaler Take 2 Puffs by inhalation every four (4) hours as needed for Wheezing. 1 Inhaler 11  
 predniSONE (DELTASONE) 20 mg tablet Take 1 Tab by mouth three (3) times daily (after meals). 15 Tab 0  
 codeine-guaifenesin  mg/5 mL liqd Take 10 mL by mouth two (2) times daily as needed. Max Daily Amount: 20 mL. 150 mL 0  
 fluocinoNIDE (LIDEX) 0.05 % topical cream Apply  to affected area two (2) times a day. 30 g 0  
 mupirocin (BACTROBAN) 2 % ointment Apply  to affected area two (2) times a day. Apply to area for 7 days 22 g 0 Past Medical History:  
Diagnosis Date  Bronchitis History reviewed. No pertinent surgical history. No Known Allergies REVIEW OF SYSTEMS: 
Review of Systems - Negative except ENT ROS: negative for - headaches, hearing change, nasal congestion, oral lesions, tinnitus, visual changes or Respiratory ROS: no cough, shortness of breath, or wheezing Cardiovascular ROS: no chest pain or dyspnea on exertion Gastrointestinal ROS: no abdominal pain, change in bowel habits, or black or blood Genito-Urinary ROS: no dysuria, trouble voiding, or hematuria Musculoskeletal ROS: negative Neurological ROS: no TIA or stroke symptoms Social History Social History  Marital status: SINGLE Spouse name: N/A  
 Number of children: N/A  
 Years of education: N/A Social History Main Topics  Smoking status: Current Some Day Smoker  Smokeless tobacco: Never Used Comment: smokes cigars maybe one or two aday  Alcohol use Yes Comment: occ  Drug use: No  
 Sexual activity: Yes  
  Partners: Female Other Topics Concern  None Social History Narrative  
r Family History Problem Relation Age of Onset  Hypertension Mother OBJECTIVE: 
Visit Vitals  /75  Pulse 62  Temp 98.4 °F (36.9 °C) (Oral)  Resp 14  
 Ht 5' 6\" (1.676 m)  Wt 135 lb 1.6 oz (61.3 kg)  SpO2 95%  BMI 21.81 kg/m2 ENT: perrla,  eom intact NECK: supple. Thyroid normal, no JVD CHEST: Bilateral fine expiratory rhonchi HEART: regular rate and rhythm ABD: soft, bowel sounds active, EXTREMITIES: no edema, pulse 1+ INTEGUMENT: clear ASSESSMENT: 
1. Moderate persistent asthmatic bronchitis without complication 2. Chronic obstructive pulmonary disease, unspecified COPD type ([address] 3. Tobacco abuse 4. Physical exam   
 
 
 
PLAN: 
 
1. Patient probably has COPD, but superimposed on this currently is significant reactive airway disease. This is aggravated by his chronic cigarette smoking. I will give him another five day course of prednisone 20 mg t.i.d. pc and will use his bronchodilators as prescribed. 2. He needs to lose weight, but cigarette smoking is preventing this. Caloric intake is fair. 3. Given his increasing age, cardiovascular disease also has to be factored in as far as prevention is concerned. . 
Orders Placed This Encounter  CBC WITH AUTOMATED DIFF  
 LIPID PANEL  
 METABOLIC PANEL, COMPREHENSIVE  
 URINALYSIS W/ RFLX MICROSCOPIC  MICROSCOPIC EXAMINATION  
 albuterol (PROVENTIL HFA, VENTOLIN HFA, PROAIR HFA) 90 mcg/actuation inhaler  predniSONE (DELTASONE) 20 mg tablet Follow-up Disposition: Return in about 6 months (around 3/13/2019).  
 
 
Denise Feliciano MD

## 2018-09-25 ENCOUNTER — PATIENT OUTREACH (OUTPATIENT)
Dept: OTHER | Age: 43
End: 2018-09-25

## 2018-09-25 NOTE — PROGRESS NOTES
HPRP f/u: 
Telephone attempt to contact patient for Health Promotion and Risk Prevention. Left discreet message on voicemail with this CC contact information. Will follow for one month for transitions of care needs. Next outreach is 10//12/18 for discussion f/u -  PCP appt - Bronchitis and resolve Epsiode. Chart Review: PCP Appt 9/13/18 Robert Wagoner MD Internal Medicine Comes in for return visit having gone to the ER because of a 1-2 week history of persistent coughing, productive of yellowish mucus, as well as nasal congestion. He was given a short course of prednisone and bronchodilator. He continues to wheeze. 
  
He has along history of cigarette smoking. Prior to this he was asymptomatic.  
  
He's had no meaningful weight gain either. 
  
ASSESSMENT: 
1. Moderate persistent asthmatic bronchitis without complication 2. Chronic obstructive pulmonary disease, unspecified COPD type ([address] 3. Tobacco abuse 4. Physical exam   
  
 PLAN: 
  
1. Patient probably has COPD, but superimposed on this currently is significant reactive airway disease. This is aggravated by his chronic cigarette smoking. I will give him another five day course of prednisone 20 mg t.i.d. pc and will use his bronchodilators as prescribed. 2. He needs to lose weight, but cigarette smoking is preventing this. Caloric intake is fair. 3. Given his increasing age, cardiovascular disease also has to be factored in as far as prevention is concerned. .Follow-up Disposition: 
Return in about 6 months (around 3/13/2019).

## 2018-10-12 ENCOUNTER — HOSPITAL ENCOUNTER (OUTPATIENT)
Dept: CT IMAGING | Age: 43
Discharge: HOME | End: 2018-10-12
Attending: UROLOGY
Payer: COMMERCIAL

## 2018-10-12 ENCOUNTER — PATIENT OUTREACH (OUTPATIENT)
Dept: OTHER | Age: 43
End: 2018-10-12

## 2018-10-12 DIAGNOSIS — R31.9 HEMATURIA: ICD-10-CM

## 2018-10-12 PROCEDURE — 74178 CT ABD&PLV WO CNTR FLWD CNTR: CPT

## 2018-10-12 PROCEDURE — 74011250636 HC RX REV CODE- 250/636: Performed by: UROLOGY

## 2018-10-12 PROCEDURE — 74011636320 HC RX REV CODE- 636/320: Performed by: UROLOGY

## 2018-10-12 RX ORDER — SODIUM CHLORIDE 0.9 % (FLUSH) 0.9 %
10 SYRINGE (ML) INJECTION
Status: COMPLETED | OUTPATIENT
Start: 2018-10-12 | End: 2018-10-12

## 2018-10-12 RX ORDER — SODIUM CHLORIDE 9 MG/ML
50 INJECTION, SOLUTION INTRAVENOUS
Status: COMPLETED | OUTPATIENT
Start: 2018-10-12 | End: 2018-10-12

## 2018-10-12 RX ADMIN — IOPAMIDOL 100 ML: 755 INJECTION, SOLUTION INTRAVENOUS at 06:41

## 2018-10-12 RX ADMIN — Medication 10 ML: at 06:41

## 2018-10-12 RX ADMIN — SODIUM CHLORIDE 50 ML/HR: 900 INJECTION, SOLUTION INTRAVENOUS at 06:41

## 2018-10-12 NOTE — PROGRESS NOTES
HPRP f/u: 
 
Contacted patient to discuss post discharge needs. Verified  and address for HIPAA security. *Spoke with patient briefly who reports that PCP informed him that he needed to see a Urologist due to lab work showed he had blood in his urine(Hematuria). Patient is at hospital for CT of Abdomen and cannot talk now. Vera Thomson MD Internal Medicine 18 Tell patient he needs to see a urologist for his microscopic hematuria. Chart Review: 
10/10/18 - Korina Zuniga MD   Urology Orders Placed CT ABD PELV W WO CONT  DX - Hematuria - Scheduled for 10/12/18 Next outreach 10/26/18 f/u - Urology Surgery

## 2018-10-16 ENCOUNTER — HOSPITAL ENCOUNTER (OUTPATIENT)
Dept: PREADMISSION TESTING | Age: 43
Discharge: HOME | End: 2018-10-16
Payer: COMMERCIAL

## 2018-10-16 VITALS
SYSTOLIC BLOOD PRESSURE: 123 MMHG | BODY MASS INDEX: 20.61 KG/M2 | DIASTOLIC BLOOD PRESSURE: 74 MMHG | WEIGHT: 139.13 LBS | HEART RATE: 68 BPM | HEIGHT: 69 IN | TEMPERATURE: 98.3 F

## 2018-10-16 LAB
ALBUMIN SERPL-MCNC: 3.8 G/DL (ref 3.5–5)
ALBUMIN/GLOB SERPL: 1.3 {RATIO} (ref 1.1–2.2)
ALP SERPL-CCNC: 95 U/L (ref 45–117)
ALT SERPL-CCNC: 42 U/L (ref 12–78)
ANION GAP SERPL CALC-SCNC: 4 MMOL/L (ref 5–15)
APPEARANCE UR: CLEAR
APTT PPP: 27.9 SEC (ref 22.1–32)
AST SERPL-CCNC: 34 U/L (ref 15–37)
BACTERIA URNS QL MICRO: NEGATIVE /HPF
BASOPHILS # BLD: 0 K/UL (ref 0–0.1)
BASOPHILS NFR BLD: 1 % (ref 0–1)
BILIRUB SERPL-MCNC: 0.3 MG/DL (ref 0.2–1)
BILIRUB UR QL: NEGATIVE
BUN SERPL-MCNC: 16 MG/DL (ref 6–20)
BUN/CREAT SERPL: 14 (ref 12–20)
CALCIUM SERPL-MCNC: 8.7 MG/DL (ref 8.5–10.1)
CAOX CRY URNS QL MICRO: ABNORMAL
CHLORIDE SERPL-SCNC: 104 MMOL/L (ref 97–108)
CO2 SERPL-SCNC: 31 MMOL/L (ref 21–32)
COLOR UR: ABNORMAL
CREAT SERPL-MCNC: 1.13 MG/DL (ref 0.7–1.3)
DIFFERENTIAL METHOD BLD: ABNORMAL
EOSINOPHIL # BLD: 0.1 K/UL (ref 0–0.4)
EOSINOPHIL NFR BLD: 2 % (ref 0–7)
EPITH CASTS URNS QL MICRO: ABNORMAL /LPF
ERYTHROCYTE [DISTWIDTH] IN BLOOD BY AUTOMATED COUNT: 13.2 % (ref 11.5–14.5)
GLOBULIN SER CALC-MCNC: 3 G/DL (ref 2–4)
GLUCOSE SERPL-MCNC: 88 MG/DL (ref 65–100)
GLUCOSE UR STRIP.AUTO-MCNC: NEGATIVE MG/DL
HCT VFR BLD AUTO: 39.1 % (ref 36.6–50.3)
HGB BLD-MCNC: 13.2 G/DL (ref 12.1–17)
HGB UR QL STRIP: ABNORMAL
IMM GRANULOCYTES # BLD: 0 K/UL (ref 0–0.04)
IMM GRANULOCYTES NFR BLD AUTO: 1 % (ref 0–0.5)
INR PPP: 1 (ref 0.9–1.1)
KETONES UR QL STRIP.AUTO: NEGATIVE MG/DL
LEUKOCYTE ESTERASE UR QL STRIP.AUTO: NEGATIVE
LYMPHOCYTES # BLD: 2.6 K/UL (ref 0.8–3.5)
LYMPHOCYTES NFR BLD: 40 % (ref 12–49)
MCH RBC QN AUTO: 32.5 PG (ref 26–34)
MCHC RBC AUTO-ENTMCNC: 33.8 G/DL (ref 30–36.5)
MCV RBC AUTO: 96.3 FL (ref 80–99)
MONOCYTES # BLD: 0.8 K/UL (ref 0–1)
MONOCYTES NFR BLD: 12 % (ref 5–13)
NEUTS SEG # BLD: 3 K/UL (ref 1.8–8)
NEUTS SEG NFR BLD: 45 % (ref 32–75)
NITRITE UR QL STRIP.AUTO: NEGATIVE
NRBC # BLD: 0 K/UL (ref 0–0.01)
NRBC BLD-RTO: 0 PER 100 WBC
PH UR STRIP: 7.5 [PH] (ref 5–8)
PLATELET # BLD AUTO: 223 K/UL (ref 150–400)
PMV BLD AUTO: 10.1 FL (ref 8.9–12.9)
POTASSIUM SERPL-SCNC: 4.1 MMOL/L (ref 3.5–5.1)
PROT SERPL-MCNC: 6.8 G/DL (ref 6.4–8.2)
PROT UR STRIP-MCNC: ABNORMAL MG/DL
PROTHROMBIN TIME: 10 SEC (ref 9–11.1)
PSA SERPL-MCNC: 0.4 NG/ML (ref 0.01–4)
RBC # BLD AUTO: 4.06 M/UL (ref 4.1–5.7)
RBC #/AREA URNS HPF: ABNORMAL /HPF (ref 0–5)
SODIUM SERPL-SCNC: 139 MMOL/L (ref 136–145)
SP GR UR REFRACTOMETRY: 1.03 (ref 1–1.03)
THERAPEUTIC RANGE,PTTT: NORMAL SECS (ref 58–77)
UA: UC IF INDICATED,UAUC: ABNORMAL
UROBILINOGEN UR QL STRIP.AUTO: 1 EU/DL (ref 0.2–1)
WBC # BLD AUTO: 6.6 K/UL (ref 4.1–11.1)
WBC URNS QL MICRO: ABNORMAL /HPF (ref 0–4)
YEAST BUDDING URNS QL: PRESENT

## 2018-10-16 PROCEDURE — 80053 COMPREHEN METABOLIC PANEL: CPT | Performed by: UROLOGY

## 2018-10-16 PROCEDURE — 81001 URINALYSIS AUTO W/SCOPE: CPT | Performed by: UROLOGY

## 2018-10-16 PROCEDURE — 85610 PROTHROMBIN TIME: CPT | Performed by: UROLOGY

## 2018-10-16 PROCEDURE — 84153 ASSAY OF PSA TOTAL: CPT | Performed by: UROLOGY

## 2018-10-16 PROCEDURE — 85025 COMPLETE CBC W/AUTO DIFF WBC: CPT | Performed by: UROLOGY

## 2018-10-16 PROCEDURE — 85730 THROMBOPLASTIN TIME PARTIAL: CPT | Performed by: UROLOGY

## 2018-10-25 ENCOUNTER — HOSPITAL ENCOUNTER (OUTPATIENT)
Age: 43
Setting detail: OUTPATIENT SURGERY
Discharge: HOME | End: 2018-10-25
Attending: UROLOGY | Admitting: UROLOGY
Payer: COMMERCIAL

## 2018-10-25 ENCOUNTER — ANESTHESIA EVENT (OUTPATIENT)
Dept: SURGERY | Age: 43
End: 2018-10-25
Payer: COMMERCIAL

## 2018-10-25 ENCOUNTER — ANESTHESIA (OUTPATIENT)
Dept: SURGERY | Age: 43
End: 2018-10-25
Payer: COMMERCIAL

## 2018-10-25 VITALS
SYSTOLIC BLOOD PRESSURE: 121 MMHG | TEMPERATURE: 97.3 F | DIASTOLIC BLOOD PRESSURE: 63 MMHG | HEIGHT: 69 IN | HEART RATE: 49 BPM | WEIGHT: 139 LBS | BODY MASS INDEX: 20.59 KG/M2 | RESPIRATION RATE: 14 BRPM | OXYGEN SATURATION: 99 %

## 2018-10-25 PROCEDURE — 76210000016 HC OR PH I REC 1 TO 1.5 HR: Performed by: UROLOGY

## 2018-10-25 PROCEDURE — 76210000020 HC REC RM PH II FIRST 0.5 HR: Performed by: UROLOGY

## 2018-10-25 PROCEDURE — 77030018846 HC SOL IRR STRL H20 ICUM -A: Performed by: UROLOGY

## 2018-10-25 PROCEDURE — 77030012893

## 2018-10-25 PROCEDURE — 74011250636 HC RX REV CODE- 250/636

## 2018-10-25 PROCEDURE — 74011250636 HC RX REV CODE- 250/636: Performed by: UROLOGY

## 2018-10-25 PROCEDURE — 74011250637 HC RX REV CODE- 250/637: Performed by: UROLOGY

## 2018-10-25 PROCEDURE — 77030020143 HC AIRWY LARYN INTUB CGAS -A: Performed by: ANESTHESIOLOGY

## 2018-10-25 PROCEDURE — 74011250636 HC RX REV CODE- 250/636: Performed by: ANESTHESIOLOGY

## 2018-10-25 PROCEDURE — 77030026438 HC STYL ET INTUB CARD -A: Performed by: ANESTHESIOLOGY

## 2018-10-25 PROCEDURE — 76010000138 HC OR TIME 0.5 TO 1 HR: Performed by: UROLOGY

## 2018-10-25 PROCEDURE — 76060000032 HC ANESTHESIA 0.5 TO 1 HR: Performed by: UROLOGY

## 2018-10-25 PROCEDURE — 74011250637 HC RX REV CODE- 250/637: Performed by: ANESTHESIOLOGY

## 2018-10-25 PROCEDURE — 77030020782 HC GWN BAIR PAWS FLX 3M -B

## 2018-10-25 PROCEDURE — 77030019927 HC TBNG IRR CYSTO BAXT -A: Performed by: UROLOGY

## 2018-10-25 PROCEDURE — 77030018832 HC SOL IRR H20 ICUM -A: Performed by: UROLOGY

## 2018-10-25 RX ORDER — MIDAZOLAM HYDROCHLORIDE 1 MG/ML
0.5 INJECTION, SOLUTION INTRAMUSCULAR; INTRAVENOUS
Status: DISCONTINUED | OUTPATIENT
Start: 2018-10-25 | End: 2018-10-25 | Stop reason: HOSPADM

## 2018-10-25 RX ORDER — ONDANSETRON 2 MG/ML
4 INJECTION INTRAMUSCULAR; INTRAVENOUS AS NEEDED
Status: DISCONTINUED | OUTPATIENT
Start: 2018-10-25 | End: 2018-10-25 | Stop reason: HOSPADM

## 2018-10-25 RX ORDER — FENTANYL CITRATE 50 UG/ML
25 INJECTION, SOLUTION INTRAMUSCULAR; INTRAVENOUS
Status: DISCONTINUED | OUTPATIENT
Start: 2018-10-25 | End: 2018-10-25 | Stop reason: HOSPADM

## 2018-10-25 RX ORDER — FENTANYL CITRATE 50 UG/ML
50 INJECTION, SOLUTION INTRAMUSCULAR; INTRAVENOUS AS NEEDED
Status: DISCONTINUED | OUTPATIENT
Start: 2018-10-25 | End: 2018-10-25 | Stop reason: HOSPADM

## 2018-10-25 RX ORDER — SODIUM CHLORIDE, SODIUM LACTATE, POTASSIUM CHLORIDE, CALCIUM CHLORIDE 600; 310; 30; 20 MG/100ML; MG/100ML; MG/100ML; MG/100ML
125 INJECTION, SOLUTION INTRAVENOUS CONTINUOUS
Status: DISCONTINUED | OUTPATIENT
Start: 2018-10-25 | End: 2018-10-25 | Stop reason: HOSPADM

## 2018-10-25 RX ORDER — MIDAZOLAM HYDROCHLORIDE 1 MG/ML
1 INJECTION, SOLUTION INTRAMUSCULAR; INTRAVENOUS AS NEEDED
Status: DISCONTINUED | OUTPATIENT
Start: 2018-10-25 | End: 2018-10-25 | Stop reason: HOSPADM

## 2018-10-25 RX ORDER — CEFAZOLIN SODIUM/WATER 2 G/20 ML
2 SYRINGE (ML) INTRAVENOUS
Status: COMPLETED | OUTPATIENT
Start: 2018-10-25 | End: 2018-10-25

## 2018-10-25 RX ORDER — PHENAZOPYRIDINE HYDROCHLORIDE 100 MG/1
200 TABLET, FILM COATED ORAL
Status: COMPLETED | OUTPATIENT
Start: 2018-10-25 | End: 2018-10-25

## 2018-10-25 RX ORDER — ONDANSETRON 2 MG/ML
INJECTION INTRAMUSCULAR; INTRAVENOUS AS NEEDED
Status: DISCONTINUED | OUTPATIENT
Start: 2018-10-25 | End: 2018-10-25 | Stop reason: HOSPADM

## 2018-10-25 RX ORDER — SODIUM CHLORIDE 0.9 % (FLUSH) 0.9 %
5-10 SYRINGE (ML) INJECTION AS NEEDED
Status: DISCONTINUED | OUTPATIENT
Start: 2018-10-25 | End: 2018-10-25 | Stop reason: HOSPADM

## 2018-10-25 RX ORDER — LIDOCAINE HYDROCHLORIDE 20 MG/ML
INJECTION, SOLUTION EPIDURAL; INFILTRATION; INTRACAUDAL; PERINEURAL AS NEEDED
Status: DISCONTINUED | OUTPATIENT
Start: 2018-10-25 | End: 2018-10-25 | Stop reason: HOSPADM

## 2018-10-25 RX ORDER — SODIUM CHLORIDE 0.9 % (FLUSH) 0.9 %
5-10 SYRINGE (ML) INJECTION EVERY 8 HOURS
Status: DISCONTINUED | OUTPATIENT
Start: 2018-10-25 | End: 2018-10-25 | Stop reason: HOSPADM

## 2018-10-25 RX ORDER — FENTANYL CITRATE 50 UG/ML
INJECTION, SOLUTION INTRAMUSCULAR; INTRAVENOUS AS NEEDED
Status: DISCONTINUED | OUTPATIENT
Start: 2018-10-25 | End: 2018-10-25 | Stop reason: HOSPADM

## 2018-10-25 RX ORDER — DEXAMETHASONE SODIUM PHOSPHATE 4 MG/ML
INJECTION, SOLUTION INTRA-ARTICULAR; INTRALESIONAL; INTRAMUSCULAR; INTRAVENOUS; SOFT TISSUE AS NEEDED
Status: DISCONTINUED | OUTPATIENT
Start: 2018-10-25 | End: 2018-10-25 | Stop reason: HOSPADM

## 2018-10-25 RX ORDER — LIDOCAINE HYDROCHLORIDE 10 MG/ML
0.1 INJECTION, SOLUTION EPIDURAL; INFILTRATION; INTRACAUDAL; PERINEURAL AS NEEDED
Status: DISCONTINUED | OUTPATIENT
Start: 2018-10-25 | End: 2018-10-25 | Stop reason: HOSPADM

## 2018-10-25 RX ORDER — MORPHINE SULFATE 10 MG/ML
2 INJECTION, SOLUTION INTRAMUSCULAR; INTRAVENOUS
Status: DISCONTINUED | OUTPATIENT
Start: 2018-10-25 | End: 2018-10-25 | Stop reason: HOSPADM

## 2018-10-25 RX ORDER — MIDAZOLAM HYDROCHLORIDE 1 MG/ML
INJECTION, SOLUTION INTRAMUSCULAR; INTRAVENOUS AS NEEDED
Status: DISCONTINUED | OUTPATIENT
Start: 2018-10-25 | End: 2018-10-25 | Stop reason: HOSPADM

## 2018-10-25 RX ORDER — PROPOFOL 10 MG/ML
INJECTION, EMULSION INTRAVENOUS AS NEEDED
Status: DISCONTINUED | OUTPATIENT
Start: 2018-10-25 | End: 2018-10-25 | Stop reason: HOSPADM

## 2018-10-25 RX ORDER — SODIUM CHLORIDE 9 MG/ML
25 INJECTION, SOLUTION INTRAVENOUS CONTINUOUS
Status: DISCONTINUED | OUTPATIENT
Start: 2018-10-25 | End: 2018-10-25 | Stop reason: HOSPADM

## 2018-10-25 RX ORDER — OXYCODONE AND ACETAMINOPHEN 5; 325 MG/1; MG/1
1 TABLET ORAL AS NEEDED
Status: DISCONTINUED | OUTPATIENT
Start: 2018-10-25 | End: 2018-10-25 | Stop reason: HOSPADM

## 2018-10-25 RX ADMIN — SODIUM CHLORIDE, SODIUM LACTATE, POTASSIUM CHLORIDE, AND CALCIUM CHLORIDE 125 ML/HR: 600; 310; 30; 20 INJECTION, SOLUTION INTRAVENOUS at 13:35

## 2018-10-25 RX ADMIN — MIDAZOLAM HYDROCHLORIDE 2 MG: 1 INJECTION, SOLUTION INTRAMUSCULAR; INTRAVENOUS at 14:16

## 2018-10-25 RX ADMIN — PHENAZOPYRIDINE HYDROCHLORIDE 200 MG: 100 TABLET ORAL at 15:47

## 2018-10-25 RX ADMIN — PROPOFOL 200 MG: 10 INJECTION, EMULSION INTRAVENOUS at 14:25

## 2018-10-25 RX ADMIN — LIDOCAINE HYDROCHLORIDE 40 MG: 20 INJECTION, SOLUTION EPIDURAL; INFILTRATION; INTRACAUDAL; PERINEURAL at 14:25

## 2018-10-25 RX ADMIN — OXYCODONE HYDROCHLORIDE AND ACETAMINOPHEN 1 TABLET: 5; 325 TABLET ORAL at 15:47

## 2018-10-25 RX ADMIN — ONDANSETRON 4 MG: 2 INJECTION INTRAMUSCULAR; INTRAVENOUS at 14:28

## 2018-10-25 RX ADMIN — DEXAMETHASONE SODIUM PHOSPHATE 4 MG: 4 INJECTION, SOLUTION INTRA-ARTICULAR; INTRALESIONAL; INTRAMUSCULAR; INTRAVENOUS; SOFT TISSUE at 14:28

## 2018-10-25 RX ADMIN — FENTANYL CITRATE 50 MCG: 50 INJECTION, SOLUTION INTRAMUSCULAR; INTRAVENOUS at 14:30

## 2018-10-25 RX ADMIN — PROPOFOL 30 MG: 10 INJECTION, EMULSION INTRAVENOUS at 14:40

## 2018-10-25 RX ADMIN — Medication 2 G: at 14:28

## 2018-10-25 NOTE — ANESTHESIA POSTPROCEDURE EVALUATION
Procedure(s): RIGID CYSTOSCOPY. Anesthesia Post Evaluation Multimodal analgesia: multimodal analgesia used between 6 hours prior to anesthesia start to PACU discharge Patient location during evaluation: PACU Patient participation: complete - patient participated Level of consciousness: awake Pain management: adequate Airway patency: patent Anesthetic complications: no 
Cardiovascular status: hemodynamically stable Respiratory status: acceptable Hydration status: acceptable Comments: I have seen and evaluated the patient. The patient is ready for PACU discharge. [address], DO Visit Vitals /63 (BP 1 Location: Right arm, BP Patient Position: At rest) Pulse (!) 49 Temp 36.3 °C (97.3 °F) Resp 14 Ht 5' 9\" (1.753 m) Wt 63 kg (139 lb) SpO2 99% BMI 20.53 kg/m²

## 2018-10-25 NOTE — DISCHARGE INSTRUCTIONS
****  PAIN PILL (WITH TYLENOL) AND PYRIDIUM GIVEN AT 3:45PM  ****     Cystoscopy: What to Expect at [facility]    A cystoscopy is a procedure that lets a doctor look inside of the bladder and the urethra. The urethra is the tube that carries urine from the bladder to outside the body. The doctor uses a thin, lighted tool called a cystoscope. Your bladder is filled with fluid. This stretches the bladder so that your doctor can look closely at the inside of your bladder. After the cystoscopy, your urethra may be sore at first, and it may burn when you urinate for the first few days after the procedure. You may feel the need to urinate more often, and your urine may be pink. These symptoms should get better in 1 or 2 days. You will probably be able to go back to most of your usual activities in 1 or 2 days. This care sheet gives you a general idea about how long it will take for you to recover. But each person recovers at a different pace. Follow the steps below to get better as quickly as possible. How can you care for yourself at home? Activity    · Rest when you feel tired. Getting enough sleep will help you recover.     · Try to walk each day. Start by walking a little more than you did the day before. Bit by bit, increase the amount you walk. Walking boosts blood flow and helps prevent pneumonia and constipation.     · Avoid strenuous activities, such as bicycle riding, jogging, weight lifting, or aerobic exercise, until your doctor says it is okay.     · Ask your doctor when you can drive again.     · Most people are able to return to work within 1 or 2 days after the procedure.     · You may shower and take baths as usual.     · Ask your doctor when it is okay for you to have sex. Diet    · You can eat your normal diet. If your stomach is upset, try bland, low-fat foods like plain rice, broiled chicken, toast, and yogurt.     · Drink plenty of fluids (unless your doctor tells you not to). Medicines    · Take pain medicines exactly as directed. ? If the doctor gave you a prescription medicine for pain, take it as prescribed. ? If you are not taking a prescription pain medicine, ask your doctor if you can take an over-the-counter medicine.     · If you think your pain medicine is making you sick to your stomach:  ? Take your medicine after meals (unless your doctor has told you not to). ? Ask your doctor for a different pain medicine.     · If your doctor prescribed antibiotics, take them as directed. Do not stop taking them just because you feel better. You need to take the full course of antibiotics. Follow-up care is a key part of your treatment and safety. Be sure to make and go to all appointments, and call your doctor if you are having problems. It's also a good idea to know your test results and keep a list of the medicines you take. When should you call for help? Call 911 anytime you think you may need emergency care. For example, call if:    · You passed out (lost consciousness).     · You have severe trouble breathing.     · You have sudden chest pain and shortness of breath, or you cough up blood.     · You have severe belly pain.    Call your doctor now or seek immediate medical care if:    · You are sick to your stomach or cannot keep fluids down.     · Your urine is still red or you see blood clots after you have urinated several times.     · You have trouble passing urine or stool, especially if you have pain or swelling in your lower belly.     · You have signs of a blood clot, such as:  ? Pain in your calf, back of the knee, thigh, or groin. ? Redness and swelling in your leg or groin.     · You develop a fever or severe chills.     · You have pain in your back just below your rib cage. This is called flank pain.    Watch closely for changes in your health, and be sure to contact your doctor if:    · You have pain or burning when you urinate.  A burning feeling is normal for a day or two after the test, but call if it does not get better.     · You have a frequent urge to urinate but can pass only small amounts of urine.     · Your urine is pink, red, or cloudy, or smells bad. It is normal for the urine to have a pinkish color for a few days after the test, but call if it does not get better. Where can you learn more? Go to http://brown-ade.info/. Enter W378 in the search box to learn more about \"Cystoscopy: What to Expect at Home. \"  Current as of: March 21, 2018  Content Version: 11.8  © 4251-0453 [employer]. Care instructions adapted under license by [employer] (which disclaims liability or warranty for this information). If you have questions about a medical condition or this instruction, always ask your healthcare professional. [address] any warranty or liability for your use of this information. ______________________________________________________________________    Anesthesia Discharge Instructions    After general anesthesia or intervenous sedation, for 24 hours or while taking prescription Narcotics:  · Limit your activities  · Do not drive or operate hazardous machinery  · If you have not urinated within 8 hours after discharge, please contact your surgeon on call. · Do not make important personal or business decisions  · Do not drink alcoholic beverages    Report the following to your surgeon:  · Excessive pain, swelling, redness or odor of or around the surgical area  · Temperature over 100.5 degrees  · Nausea and vomiting lasting longer than 4 hours or if unable to take medication  · Any signs of decreased circulation or nerve impairment to extremity:  Change in color, persistent numbness, tingling, coldness or increased pain.   · Any questions

## 2018-10-25 NOTE — OP NOTES
Cystoscopy Note    Indications: This is a 39 y.o. male who presents with microscopic hematuria. He was positive for hematuria. Preoperative Diagnosis: HEMATURIA    Postoperative Diagnosis: HEMATURIA    Estimated Blood Loss: 0    Surgeon(s) and Role:     Steph Ree, MD - Primary     Anesthesia:  General     Indications: Hematuria    Procedure in Detail:  After anesthesia was administered, Hannah Martin was prepped and draped in the usual sterile fashion. A 21fr. sheath (Rigid) cystoscope was used. Findings:  Anterior urethra: Normal without strictures  Prostate: open without obstruction   Bladder neck: open  Bladder mucosa: Intact  Turbeculation: none  Diverticula: none  Ureteral orifices: normal  Specimens Removed: None    Vital signs were stable and the patient was taken to the recovery room in good condition.     Signed By: Maribel Cyr MD                         October 25, 2018

## 2018-10-25 NOTE — PERIOP NOTES
Patient: Juan Rausch MRN: [id number]  SSN: [id number] YOB: 1975  Age: 39 y.o. Sex: male Patient is status post Procedure(s): RIGID CYSTOSCOPY. Surgeon(s) and Role: Bright Han MD - Primary Local/Dose/Irrigation:  None Peripheral IV 10/25/18 Left Forearm (Active) Site Assessment Clean, dry, & intact 10/25/2018  1:26 PM  
Phlebitis Assessment 0 10/25/2018  1:26 PM  
Infiltration Assessment 0 10/25/2018  1:26 PM  
Dressing Status Clean, dry, & intact 10/25/2018  1:26 PM  
Dressing Type Transparent 10/25/2018  1:26 PM  
Hub Color/Line Status Pink 10/25/2018  1:26 PM  
        
Airway - Endotracheal Tube 10/25/18 Oral (Active) Dressing/Packing:    
Splint/Cast:  ] Other:

## 2018-10-25 NOTE — ANESTHESIA PREPROCEDURE EVALUATION
Anesthetic History No history of anesthetic complications Review of Systems / Medical History Patient summary reviewed, nursing notes reviewed and pertinent labs reviewed Pulmonary Smoker Neuro/Psych Within defined limits Cardiovascular Within defined limits GI/Hepatic/Renal 
Within defined limits Endo/Other Within defined limits Other Findings Physical Exam 
 
Airway Mallampati: II 
TM Distance: > 6 cm Neck ROM: normal range of motion Mouth opening: Normal 
 
 Cardiovascular Regular rate and rhythm,  S1 and S2 normal,  no murmur, click, rub, or gallop Dental 
No notable dental hx Pulmonary Breath sounds clear to auscultation Abdominal 
GI exam deferred Other Findings Anesthetic Plan ASA: 1 Anesthesia type: general 
 
 
 
 
Induction: Intravenous Anesthetic plan and risks discussed with: Patient

## 2018-10-25 NOTE — BRIEF OP NOTE
BRIEF OPERATIVE NOTE Date of Procedure: 10/25/2018 Preoperative Diagnosis: HEMATURIA Postoperative Diagnosis: HEMATURIA Procedure(s): RIGID CYSTOSCOPY Surgeon(s) and Role: Britni Cedeño MD - Primary Surgical Assistant: OR Staff Surgical Staff: 
Circ-1: Audie Ramos RN 
Circ-Relief: Johnny Paiz RN Scrub Tech-1: Alessio Perea Staff: Nichole العلي RN Event Time In Time Out Incision Start 1438 Incision Close 1441 Anesthesia: General  
Estimated Blood Loss: 0 Specimens: * No specimens in log * Findings: Normal  
Complications: None Implants: * No implants in log *

## 2018-10-26 ENCOUNTER — PATIENT OUTREACH (OUTPATIENT)
Dept: OTHER | Age: 43
End: 2018-10-26

## 2018-10-26 NOTE — PROGRESS NOTES
Contacted patient for transitions of care services. Verified  and address for HIPAA security. *Spoke with patient who reports that he is doing well from procedure. States that he is a little irritated when he urinates. - Denies any hematuria *Reviewed Red Flags and patient verbalized understanding. Chart Review: 10/25/18 - Op Note Breanne Thomson MD - Urology Procedure: Procedure(s): RIGID CYSTOSCOPY Preoperative Diagnosis: HEMATURIA 
  
Postoperative Diagnosis: HEMATURIA 
  
 
Findings: Anterior urethra: Normal without strictures Prostate: open without obstruction Bladder neck: open Bladder mucosa: Intact Turbeculation: none Diverticula: none Ureteral orifices: normal 
Specimens Removed: None Schedule an appointment with Tatianna Moreno MD as soon as possible for a visit in 1 week(s) [phone] *Patient states that he has an appointment Wednesday 10/31. Call 911 anytime you think you may need emergency care. For example, call if: You passed out (lost consciousness). You have severe trouble breathing. You have sudden chest pain and shortness of breath, or you cough up blood. You have severe belly pain. Call your doctor now or seek immediate medical care if:  
You are sick to your stomach or cannot keep fluids down. Your urine is still red or you see blood clots after you have urinated several times. You have trouble passing urine or stool, especially if you have pain or swelling in your lower belly. You have signs of a blood clot, such as: ?Pain in your calf, back of the knee, thigh, or groin. ? Redness and swelling in your leg or groin. You develop a fever or severe chills. You have pain in your back just below your rib cage. This is called flank pain. Patient had no other questions or concerns. Contact information provided and reminded him that I can be reached if any needs arise. Next outreach 11/1/18 - f/u Urology appointment and Resolve Episode.

## 2018-11-01 ENCOUNTER — PATIENT OUTREACH (OUTPATIENT)
Dept: OTHER | Age: 43
End: 2018-11-01

## 2018-11-01 NOTE — PROGRESS NOTES
Final call made today. Contacted patient for Health Promotion and Risk Prevention  services. Verified  and address for HIPAA security. *Spoke with patient who reports that he is doing well. -Bronchitis has resolved 
  -Hematuria has resolved after Cystoscopy. Jefferykiarra Sal to Urology f/u appointment on 10/31/18. States that appointment went well. Cannot view Tiffanie Schultz MD - Urology notes in [facility]. *Patient states that he does not need to f/u with his PCP at this time. Resolving current episode 9/10/18(Transitions of care complete). No further ED/UC or hospital admissions within 30 days post discharge. Patient attended follow-up appointments as directed. No outreach from patient to [address] [place] [address]. Patient had no other questions or concerns. Contact information provided and reminded him that I can be reached if any needs arise in the future.

## 2019-10-18 ENCOUNTER — OFFICE VISIT (OUTPATIENT)
Dept: URGENT CARE | Age: 44
End: 2019-10-18

## 2019-10-18 VITALS
WEIGHT: 138 LBS | DIASTOLIC BLOOD PRESSURE: 65 MMHG | OXYGEN SATURATION: 97 % | HEART RATE: 71 BPM | HEIGHT: 67 IN | RESPIRATION RATE: 16 BRPM | BODY MASS INDEX: 21.66 KG/M2 | TEMPERATURE: 99 F | SYSTOLIC BLOOD PRESSURE: 135 MMHG

## 2019-10-18 DIAGNOSIS — H10.13 ALLERGIC CONJUNCTIVITIS OF BOTH EYES: Primary | ICD-10-CM

## 2019-10-18 NOTE — PATIENT INSTRUCTIONS
Pinkeye: Care Instructions Your Care Instructions Pinkeye is redness and swelling of the eye surface and the conjunctiva (the lining of the eyelid and the covering of the white part of the eye). Pinkeye is also called conjunctivitis. Pinkeye is often caused by infection with bacteria or a virus. Dry air, allergies, smoke, and chemicals are other common causes. Pinkeye often clears on its own in 7 to 10 days. Antibiotics only help if the pinkeye is caused by bacteria. Pinkeye caused by infection spreads easily. If an allergy or chemical is causing pinkeye, it will not go away unless you can avoid whatever is causing it. Follow-up care is a key part of your treatment and safety. Be sure to make and go to all appointments, and call your doctor if you are having problems. It's also a good idea to know your test results and keep a list of the medicines you take. How can you care for yourself at home? · Wash your hands often. Always wash them before and after you treat pinkeye or touch your eyes or face. · Use moist cotton or a clean, wet cloth to remove crust. Wipe from the inside corner of the eye to the outside. Use a clean part of the cloth for each wipe. · Put cold or warm wet cloths on your eye a few times a day if the eye hurts. · Do not wear contact lenses or eye makeup until the pinkeye is gone. Throw away any eye makeup you were using when you got pinkeye. Clean your contacts and storage case. If you wear disposable contacts, use a new pair when your eye has cleared and it is safe to wear contacts again. · If the doctor gave you antibiotic ointment or eyedrops, use them as directed. Use the medicine for as long as instructed, even if your eye starts looking better soon. Keep the bottle tip clean, and do not let it touch the eye area. · To put in eyedrops or ointment: ? Tilt your head back, and pull your lower eyelid down with one finger. ? Drop or squirt the medicine inside the lower lid. ? Close your eye for 30 to 60 seconds to let the drops or ointment move around. ? Do not touch the ointment or dropper tip to your eyelashes or any other surface. · Do not share towels, pillows, or washcloths while you have pinkeye. When should you call for help? Call your doctor now or seek immediate medical care if: 
  · You have pain in your eye, not just irritation on the surface.  
  · You have a change in vision or loss of vision.  
  · You have an increase in discharge from the eye.  
  · Your eye has not started to improve or begins to get worse within 48 hours after you start using antibiotics.  
  · Pinkeye lasts longer than 7 days.  
 Watch closely for changes in your health, and be sure to contact your doctor if you have any problems. Where can you learn more? Go to http://brown-ade.info/. Enter Y392 in the search box to learn more about \"Pinkeye: Care Instructions. \" Current as of: June 26, 2019 Content Version: 12.2 © 9386-9100 [employer]. Care instructions adapted under license by [employer] (which disclaims liability or warranty for this information). If you have questions about a medical condition or this instruction, always ask your healthcare professional. [address] any warranty or liability for your use of this information. Allergic Conjunctivitis - Benadryl by mouth to help with itching - Dry eyes drops

## 2019-10-18 NOTE — PROGRESS NOTES
The history is provided by the patient. Eye Problem   This is a new problem. The current episode started 2 days ago. The problem occurs constantly. The problem has not changed since onset. Pertinent negatives include no chest pain, no headaches and no shortness of breath. Associated symptoms comments: Eye itching bilaterally with tearing. Nothing aggravates the symptoms. Nothing relieves the symptoms. He has tried nothing for the symptoms. Patient states his girlfriend had conjunctivitis and was placed on a antibiotic. Patient states last night his eyes started itching and watering but there have not been any crusting or pus discharge. Patient states he does have a minor cold. Patient denies vision changes, chest pain, SOB or headache. Past Medical History:   Diagnosis Date    Bronchitis     Hematuria         Past Surgical History:   Procedure Laterality Date    HX ADENOIDECTOMY  45    HX HERNIA REPAIR  1979    HX TONSILLECTOMY  1978         Family History   Problem Relation Age of Onset    Hypertension Mother    [facility] Anesth Problems Neg Hx         Social History     Socioeconomic History    Marital status: SINGLE     Spouse name: Not on file    Number of children: Not on file    Years of education: Not on file    Highest education level: Not on file   Occupational History    Not on file   Social Needs    Financial resource strain: Not on file    Food insecurity:     Worry: Not on file     Inability: Not on file    Transportation needs:     Medical: Not on file     Non-medical: Not on file   Tobacco Use    Smoking status: Current Some Day Smoker     Years: 25.00    Smokeless tobacco: Never Used    Tobacco comment: smokes cigars maybe one or two aday   Substance and Sexual Activity    Alcohol use:  Yes     Alcohol/week: 4.0 standard drinks     Types: 2 Cans of beer, 2 Shots of liquor per week     Comment: occ    Drug use: Yes     Types: Marijuana     Comment: [address] [facility] activity: Yes     Partners: Female   Lifestyle    Physical activity:     Days per week: Not on file     Minutes per session: Not on file    Stress: Not on file   Relationships    Social connections:     Talks on phone: Not on file     Gets together: Not on file     Attends Roman Catholic service: Not on file     Active member of club or organization: Not on file     Attends meetings of clubs or organizations: Not on file     Relationship status: Not on file    Intimate partner violence:     Fear of current or ex partner: Not on file     Emotionally abused: Not on file     Physically abused: Not on file     Forced sexual activity: Not on file   Other Topics Concern    Not on file   Social History Narrative    Not on file                ALLERGIES: Patient has no known allergies. Review of Systems   Constitutional: Negative for chills, fatigue and fever. HENT: Negative. Eyes: Positive for discharge (clear watery discharge) and itching. Negative for pain, redness and visual disturbance. Respiratory: Negative for cough, chest tightness and shortness of breath. Cardiovascular: Negative. Negative for chest pain. Gastrointestinal: Negative. Genitourinary: Negative. Musculoskeletal: Negative. Skin: Negative. Neurological: Negative for dizziness, syncope, weakness, light-headedness, numbness and headaches. Vitals:    10/18/19 0905   BP: 135/65   Pulse: 71   Resp: 16   Temp: 99 °F (37.2 °C)   SpO2: 97%   Weight: 138 lb (62.6 kg)   Height: 5' 7\" (1.702 m)       Physical Exam   Constitutional: He is oriented to person, place, and time. He appears well-developed and well-nourished. HENT:   Head: Normocephalic. Right Ear: External ear normal.   Left Ear: External ear normal.   Nose: Nose normal.   Mouth/Throat: Oropharynx is clear and moist. No oropharyngeal exudate. Eyes: Pupils are equal, round, and reactive to light. Conjunctivae are normal. Right eye exhibits no discharge.  Left eye exhibits no discharge. Neck: Normal range of motion. Cardiovascular: Normal rate, regular rhythm and normal heart sounds. Pulmonary/Chest: Effort normal and breath sounds normal. No respiratory distress. He has no wheezes. He has no rales. Musculoskeletal: Normal range of motion. Lymphadenopathy:     He has no cervical adenopathy. Neurological: He is alert and oriented to person, place, and time. Skin: Skin is warm and dry. Psychiatric: He has a normal mood and affect. MDM     Differential Diagnosis; Clinical Impression; Plan:     (H10.13) Allergic conjunctivitis of both eyes  (primary encounter diagnosis)  No orders of the defined types were placed in this encounter. Educated patient on the various conjunctivitis and patients are likely due to allergens. Advised to use OTC Benadryl and dry eye drops. The patients condition was discussed with the patient and they understand. The patient is to follow up with PCP. If signs and symptoms become worse the pt is to go to the ER. The patient is to take medications as prescribed. AVS given with patient instructions upon discharge.                   Procedures

## 2019-12-12 ENCOUNTER — OFFICE VISIT (OUTPATIENT)
Dept: URGENT CARE | Age: 44
End: 2019-12-12

## 2019-12-12 VITALS
TEMPERATURE: 98.9 F | DIASTOLIC BLOOD PRESSURE: 64 MMHG | RESPIRATION RATE: 16 BRPM | WEIGHT: 138 LBS | SYSTOLIC BLOOD PRESSURE: 118 MMHG | BODY MASS INDEX: 21.66 KG/M2 | HEART RATE: 57 BPM | OXYGEN SATURATION: 98 % | HEIGHT: 67 IN

## 2019-12-12 DIAGNOSIS — J06.9 BACTERIAL UPPER RESPIRATORY INFECTION: Primary | ICD-10-CM

## 2019-12-12 DIAGNOSIS — B96.89 BACTERIAL UPPER RESPIRATORY INFECTION: Primary | ICD-10-CM

## 2019-12-12 DIAGNOSIS — R05.9 COUGH: ICD-10-CM

## 2019-12-12 RX ORDER — DOXYCYCLINE 100 MG/1
100 TABLET ORAL 2 TIMES DAILY
Qty: 14 TAB | Refills: 0 | Status: SHIPPED | OUTPATIENT
Start: 2019-12-12 | End: 2019-12-19

## 2019-12-12 RX ORDER — PROMETHAZINE HYDROCHLORIDE AND DEXTROMETHORPHAN HYDROBROMIDE 6.25; 15 MG/5ML; MG/5ML
5 SYRUP ORAL
Qty: 210 ML | Refills: 0 | Status: SHIPPED | OUTPATIENT
Start: 2019-12-12 | End: 2019-12-19

## 2019-12-12 NOTE — PATIENT INSTRUCTIONS

## 2019-12-12 NOTE — PROGRESS NOTES
The history is provided by the patient. Cold Symptoms   The history is provided by the patient. This is a new problem. The current episode started more than 1 week ago. The problem occurs constantly. The problem has been gradually worsening. The cough is non-productive. There has been no fever. Associated symptoms include rhinorrhea. Pertinent negatives include no chest pain, no chills, no sweats, no headaches, no sore throat, no shortness of breath, no wheezing, no nausea and no vomiting. Associated symptoms comments: Cough and congestion with post nasal drainage. He has tried decongestants and cough syrup for the symptoms. He is a smoker. His past medical history is significant for bronchitis. Past Medical History:   Diagnosis Date    Bronchitis     Hematuria         Past Surgical History:   Procedure Laterality Date    HX ADENOIDECTOMY  45    HX HERNIA REPAIR  1979    HX TONSILLECTOMY  1978         Family History   Problem Relation Age of Onset    Hypertension Mother    [facility] Anesth Problems Neg Hx         Social History     Socioeconomic History    Marital status: SINGLE     Spouse name: Not on file    Number of children: Not on file    Years of education: Not on file    Highest education level: Not on file   Occupational History    Not on file   Social Needs    Financial resource strain: Not on file    Food insecurity:     Worry: Not on file     Inability: Not on file    Transportation needs:     Medical: Not on file     Non-medical: Not on file   Tobacco Use    Smoking status: Current Some Day Smoker     Years: 25.00    Smokeless tobacco: Never Used    Tobacco comment: smokes cigars maybe one or two aday   Substance and Sexual Activity    Alcohol use:  Yes     Alcohol/week: 4.0 standard drinks     Types: 2 Cans of beer, 2 Shots of liquor per week     Comment: occ    Drug use: Yes     Types: Marijuana     Comment: QUIT 1996    Sexual activity: Yes     Partners: Female   Lifestyle    Physical activity:     Days per week: Not on file     Minutes per session: Not on file    Stress: Not on file   Relationships    Social connections:     Talks on phone: Not on file     Gets together: Not on file     Attends Jewish service: Not on file     Active member of club or organization: Not on file     Attends meetings of clubs or organizations: Not on file     Relationship status: Not on file    Intimate partner violence:     Fear of current or ex partner: Not on file     Emotionally abused: Not on file     Physically abused: Not on file     Forced sexual activity: Not on file   Other Topics Concern    Not on file   Social History Narrative    Not on file                ALLERGIES: Patient has no known allergies. Review of Systems   Constitutional: Positive for fatigue. Negative for chills and fever. HENT: Positive for congestion, postnasal drip, rhinorrhea and sinus pressure. Negative for sore throat and trouble swallowing. Respiratory: Positive for cough. Negative for chest tightness, shortness of breath and wheezing. Cardiovascular: Negative. Negative for chest pain. Gastrointestinal: Negative. Negative for nausea and vomiting. Genitourinary: Negative. Musculoskeletal: Negative. Skin: Negative. Neurological: Negative for dizziness, syncope, weakness, light-headedness, numbness and headaches. Vitals:    12/12/19 0834   BP: 118/64   Pulse: (!) 57   Resp: 16   Temp: 98.9 °F (37.2 °C)   SpO2: 98%   Weight: 138 lb (62.6 kg)   Height: 5' 7\" (1.702 m)       Physical Exam  Constitutional:       Appearance: He is well-developed. HENT:      Right Ear: Tympanic membrane normal.      Left Ear: Tympanic membrane normal.      Nose: Congestion and rhinorrhea present. Mouth/Throat:      Comments: Post nasal drainage  Eyes:      Conjunctiva/sclera: Conjunctivae normal.      Pupils: Pupils are equal, round, and reactive to light.    Neck:      Musculoskeletal: Normal range of motion. Cardiovascular:      Rate and Rhythm: Normal rate and regular rhythm. Heart sounds: Normal heart sounds. Pulmonary:      Effort: Pulmonary effort is normal.      Breath sounds: Normal breath sounds. Musculoskeletal: Normal range of motion. Lymphadenopathy:      Cervical: No cervical adenopathy. Skin:     General: Skin is warm and dry. Neurological:      Mental Status: He is alert and oriented to person, place, and time. MDM     Differential Diagnosis; Clinical Impression; Plan:     No diagnosis found. No orders of the defined types were placed in this encounter. Advised to increase fluids. Take ibuprofen or tylenol for discomfort. The patients condition was discussed with the patient and they understand. The patient is to follow up with PCP. If signs and symptoms become worse the pt is to go to the ER. The patient is to take medications as prescribed. AVS given with patient instructions upon discharge.                   Procedures

## 2019-12-12 NOTE — LETTER
[address] 
[place] [address] 
[phone] Work/School Note Date: 12/12/2019 To Whom It May concern: 
 
Robert Sanchez was seen and treated today in the urgent care center by the following:  Avon Alpers, DNP Robert Sanchez may return to work on 12/13/2019. Sincerely, Avon Alpers, NP

## 2019-12-19 ENCOUNTER — APPOINTMENT (OUTPATIENT)
Dept: GENERAL RADIOLOGY | Age: 44
End: 2019-12-19
Attending: PHYSICIAN ASSISTANT
Payer: COMMERCIAL

## 2019-12-19 ENCOUNTER — HOSPITAL ENCOUNTER (EMERGENCY)
Age: 44
Discharge: HOME | End: 2019-12-19
Attending: EMERGENCY MEDICINE | Admitting: EMERGENCY MEDICINE
Payer: COMMERCIAL

## 2019-12-19 VITALS
DIASTOLIC BLOOD PRESSURE: 90 MMHG | HEIGHT: 67 IN | OXYGEN SATURATION: 96 % | SYSTOLIC BLOOD PRESSURE: 137 MMHG | BODY MASS INDEX: 21.97 KG/M2 | HEART RATE: 72 BPM | WEIGHT: 140 LBS | TEMPERATURE: 98.4 F | RESPIRATION RATE: 18 BRPM

## 2019-12-19 DIAGNOSIS — M54.50 BILATERAL LOW BACK PAIN WITHOUT SCIATICA, UNSPECIFIED CHRONICITY: ICD-10-CM

## 2019-12-19 DIAGNOSIS — J06.9 ACUTE URI: Primary | ICD-10-CM

## 2019-12-19 PROCEDURE — 99282 EMERGENCY DEPT VISIT SF MDM: CPT

## 2019-12-19 PROCEDURE — 71046 X-RAY EXAM CHEST 2 VIEWS: CPT

## 2019-12-19 RX ORDER — CYCLOBENZAPRINE HCL 10 MG
10 TABLET ORAL
Qty: 20 TAB | Refills: 0 | Status: SHIPPED | OUTPATIENT
Start: 2019-12-19 | End: 2021-06-30

## 2019-12-19 RX ORDER — METHYLPREDNISOLONE 4 MG/1
TABLET ORAL
Qty: 1 DOSE PACK | Refills: 0 | Status: SHIPPED | OUTPATIENT
Start: 2019-12-19 | End: 2021-06-30

## 2019-12-19 RX ORDER — AZELASTINE 1 MG/ML
1 SPRAY, METERED NASAL 2 TIMES DAILY
Qty: 1 BOTTLE | Refills: 0 | Status: SHIPPED | OUTPATIENT
Start: 2019-12-19 | End: 2021-06-30

## 2019-12-19 NOTE — LETTER
[address] 
[address] [place] [address] [id number] 
[phone] Work/School Note Date: 12/19/2019 To Whom It May concern: 
 
Beata Ellis was seen and treated today in the emergency room by the following provider(s): 
Attending Provider: Joe Sloan MD 
Physician Assistant: SAMANTHA Arriaza. Beata Ellis may return to work on 12/20/2019.  
 
Sincerely, 
 
 
 
 
SAMANTHA Snyder

## 2019-12-19 NOTE — DISCHARGE INSTRUCTIONS
Patient Education        Back Pain: Care Instructions  Your Care Instructions    Back pain has many possible causes. It is often related to problems with muscles and ligaments of the back. It may also be related to problems with the nerves, discs, or bones of the back. Moving, lifting, standing, sitting, or sleeping in an awkward way can strain the back. Sometimes you don't notice the injury until later. Arthritis is another common cause of back pain. Although it may hurt a lot, back pain usually improves on its own within several weeks. Most people recover in 12 weeks or less. Using good home treatment and being careful not to stress your back can help you feel better sooner. Follow-up care is a key part of your treatment and safety. Be sure to make and go to all appointments, and call your doctor if you are having problems. It's also a good idea to know your test results and keep a list of the medicines you take. How can you care for yourself at home? · Sit or lie in positions that are most comfortable and reduce your pain. Try one of these positions when you lie down:  ? Lie on your back with your knees bent and supported by large pillows. ? Lie on the floor with your legs on the seat of a sofa or chair. ? Lie on your side with your knees and hips bent and a pillow between your legs. ? Lie on your stomach if it does not make pain worse. · Do not sit up in bed, and avoid soft couches and twisted positions. Bed rest can help relieve pain at first, but it delays healing. Avoid bed rest after the first day of back pain. · Change positions every 30 minutes. If you must sit for long periods of time, take breaks from sitting. Get up and walk around, or lie in a comfortable position. · Try using a heating pad on a low or medium setting for 15 to 20 minutes every 2 or 3 hours. Try a warm shower in place of one session with the heating pad. · You can also try an ice pack for 10 to 15 minutes every 2 to 3 hours. Put a thin cloth between the ice pack and your skin. · Take pain medicines exactly as directed. ? If the doctor gave you a prescription medicine for pain, take it as prescribed. ? If you are not taking a prescription pain medicine, ask your doctor if you can take an over-the-counter medicine. · Take short walks several times a day. You can start with 5 to 10 minutes, 3 or 4 times a day, and work up to longer walks. Walk on level surfaces and avoid hills and stairs until your back is better. · Return to work and other activities as soon as you can. Continued rest without activity is usually not good for your back. · To prevent future back pain, do exercises to stretch and strengthen your back and stomach. Learn how to use good posture, safe lifting techniques, and proper body mechanics. When should you call for help? Call your doctor now or seek immediate medical care if:    · You have new or worsening numbness in your legs.     · You have new or worsening weakness in your legs. (This could make it hard to stand up.)     · You lose control of your bladder or bowels.    Watch closely for changes in your health, and be sure to contact your doctor if:    · You have a fever, lose weight, or don't feel well.     · You do not get better as expected. Where can you learn more? Go to http://brown-ade.info/. Enter N622 in the search box to learn more about \"Back Pain: Care Instructions. \"  Current as of: June 26, 2019  Content Version: 12.2  © 3042-4567 [employer]. Care instructions adapted under license by [employer] (which disclaims liability or warranty for this information). If you have questions about a medical condition or this instruction, always ask your healthcare professional. [place] [address] any warranty or liability for your use of this information.          Patient Education        Upper Respiratory Infection (Cold): Care Instructions  Your Care Instructions    An upper respiratory infection, or URI, is an infection of the nose, sinuses, or throat. URIs are spread by coughs, sneezes, and direct contact. The common cold is the most frequent kind of URI. The flu and sinus infections are other kinds of URIs. Almost all URIs are caused by viruses. Antibiotics won't cure them. But you can treat most infections with home care. This may include drinking lots of fluids and taking over-the-counter pain medicine. You will probably feel better in 4 to 10 days. The doctor has checked you carefully, but problems can develop later. If you notice any problems or new symptoms, get medical treatment right away. Follow-up care is a key part of your treatment and safety. Be sure to make and go to all appointments, and call your doctor if you are having problems. It's also a good idea to know your test results and keep a list of the medicines you take. How can you care for yourself at home? · To prevent dehydration, drink plenty of fluids, enough so that your urine is light yellow or clear like water. Choose water and other caffeine-free clear liquids until you feel better. If you have kidney, heart, or liver disease and have to limit fluids, talk with your doctor before you increase the amount of fluids you drink. · Take an over-the-counter pain medicine, such as acetaminophen (Tylenol), ibuprofen (Advil, Motrin), or naproxen (Aleve). Read and follow all instructions on the label. · Before you use cough and cold medicines, check the label. These medicines may not be safe for young children or for people with certain health problems. · Be careful when taking over-the-counter cold or flu medicines and Tylenol at the same time. Many of these medicines have acetaminophen, which is Tylenol. Read the labels to make sure that you are not taking more than the recommended dose. Too much acetaminophen (Tylenol) can be harmful.   · Get plenty of rest.  · Do not smoke or allow others to smoke around you. If you need help quitting, talk to your doctor about stop-smoking programs and medicines. These can increase your chances of quitting for good. When should you call for help? Call 911 anytime you think you may need emergency care. For example, call if:    · You have severe trouble breathing.    Call your doctor now or seek immediate medical care if:    · You seem to be getting much sicker.     · You have new or worse trouble breathing.     · You have a new or higher fever.     · You have a new rash.    Watch closely for changes in your health, and be sure to contact your doctor if:    · You have a new symptom, such as a sore throat, an earache, or sinus pain.     · You cough more deeply or more often, especially if you notice more mucus or a change in the color of your mucus.     · You do not get better as expected. Where can you learn more? Go to http://brown-ade.info/. Enter I635 in the search box to learn more about \"Upper Respiratory Infection (Cold): Care Instructions. \"  Current as of: June 9, 2019  Content Version: 12.2  © 4080-9819 [employer]. Care instructions adapted under license by [employer] (which disclaims liability or warranty for this information). If you have questions about a medical condition or this instruction, always ask your healthcare professional. [address] any warranty or liability for your use of this information.

## 2019-12-19 NOTE — ED PROVIDER NOTES
37 y/o AA male presents to the ED for the evaluation of cough, nasal congestion and low back pain. According to patient, he is a  at [facility] in the ER and states he was seen at [address] off [facility] on 12/12 and dx with URI. He was given doxy and promethazine dm. He states he has been feeling better from the cough but back pain persists and has since developed worsening nasal congestion. No cp, sob or alfonso. Mild scratchy throat. Here today because he is concerned about PNA. Low back pain does not radiate. Worse with certain movements. No other acute medical complaints expressed at this time. Past Medical History:   Diagnosis Date    Bronchitis     Hematuria        Past Surgical History:   Procedure Laterality Date    HX ADENOIDECTOMY  45    HX HERNIA REPAIR  1979    HX TONSILLECTOMY  1978         Family History:   Problem Relation Age of Onset    Hypertension Mother    [address] [facility] [address] Anesth Problems Neg Hx        Social History     Socioeconomic History    Marital status: SINGLE     Spouse name: Not on file    Number of children: Not on file    Years of education: Not on file    Highest education level: Not on file   Occupational History    Not on file   Social Needs    Financial resource strain: Not on file    Food insecurity:     Worry: Not on file     Inability: Not on file    Transportation needs:     Medical: Not on file     Non-medical: Not on file   Tobacco Use    Smoking status: Current Some Day Smoker     Years: 25.00    Smokeless tobacco: Never Used    Tobacco comment: smokes cigars maybe one or two aday   Substance and Sexual Activity    Alcohol use:  Yes     Alcohol/week: 4.0 standard drinks     Types: 2 Cans of beer, 2 Shots of liquor per week     Comment: occ    Drug use: Yes     Types: Marijuana     Comment: [address] Sexual activity: Yes     Partners: Female   Lifestyle    Physical activity:     Days per week: Not on file     Minutes per session: Not on file    Stress: Not on file   Relationships    Social connections:     Talks on phone: Not on file     Gets together: Not on file     Attends Sikh service: Not on file     Active member of club or organization: Not on file     Attends meetings of clubs or organizations: Not on file     Relationship status: Not on file    Intimate partner violence:     Fear of current or ex partner: Not on file     Emotionally abused: Not on file     Physically abused: Not on file     Forced sexual activity: Not on file   Other Topics Concern    Not on file   Social History Narrative    Not on file         ALLERGIES: Patient has no known allergies. Review of Systems   Constitutional: Negative for chills and fever. HENT: Positive for congestion and sore throat. Negative for ear pain, facial swelling, hearing loss and trouble swallowing. Eyes: Negative. Respiratory: Positive for cough. Negative for chest tightness, shortness of breath and wheezing. Cardiovascular: Negative for chest pain. Gastrointestinal: Negative for abdominal pain, nausea and vomiting. Genitourinary: Negative for dysuria, flank pain, frequency, hematuria and urgency. Musculoskeletal: Positive for back pain. Negative for neck pain and neck stiffness. Skin: Negative. Negative for rash and wound. Neurological: Negative for dizziness, weakness and headaches. Psychiatric/Behavioral: Negative. All other systems reviewed and are negative. Vitals:    12/19/19 1314   BP: 137/90   Pulse: 72   Resp: 18   Temp: 98.4 °F (36.9 °C)   SpO2: 96%   Weight: 63.5 kg (140 lb)   Height: 5' 7\" (1.702 m)            Physical Exam  Vitals signs and nursing note reviewed. Constitutional:       Appearance: He is well-developed. HENT:      Head: Normocephalic and atraumatic. Eyes:      Conjunctiva/sclera: Conjunctivae normal.      Pupils: Pupils are equal, round, and reactive to light.    Neck:      Musculoskeletal: Normal range of motion and neck supple. Comments: No midline tenderness to palpation of cspine. Cardiovascular:      Rate and Rhythm: Normal rate and regular rhythm. Pulses: Normal pulses. Heart sounds: Normal heart sounds. Pulmonary:      Effort: Pulmonary effort is normal. No respiratory distress. Breath sounds: Normal breath sounds. No stridor. No wheezing, rhonchi or rales. Chest:      Chest wall: No tenderness. Abdominal:      General: Bowel sounds are normal. There is no distension. Palpations: Abdomen is soft. There is no mass. Tenderness: There is no tenderness. There is no guarding or rebound. Comments: No aortic bruits,  No femoral bruits. 2+ femoral pulses     Musculoskeletal: Normal range of motion. General: No tenderness. Comments: No TS spine pain with palpation. Mild Lspine pain with palpation. No stepoffs, no deformity  No redness, rashes, warmth, or cellulitis. 5/5 flexion/extension of hips bilaterally  5/5 great toes strength bilaterally  5/5 dorsiflexion/plantarflexion bilaterally  Straight leg raise negative. No saddle anesthesia present. Walks on heels/toes. Skin:     General: Skin is warm and dry. Findings: No erythema or rash. Neurological:      Mental Status: He is oriented to person, place, and time. Cranial Nerves: No cranial nerve deficit. Motor: No abnormal muscle tone. Coordination: Coordination normal.      Deep Tendon Reflexes: Reflexes are normal and symmetric. Reflexes normal.   Psychiatric:         Behavior: Behavior normal.         Thought Content:  Thought content normal.         Judgment: Judgment normal.          MDM  Number of Diagnoses or Management Options  Acute URI:   Bilateral low back pain without sciatica, unspecified chronicity:   Diagnosis management comments: 39 y/o male with URI and low back pain-msk in nature  Will d/c home with medrol jim/flexeril and azelastine for symptom relief  Finish course of doxy as it seems to be improving his symptoms  Follow up with PCP if symptoms persist despite treatment  Patient verbalizes understanding of dx and is aware of what s/sx to monitor that would warrant return visit to ED      Risk of Complications, Morbidity, and/or Mortality  Presenting problems: moderate  Diagnostic procedures: moderate  Management options: moderate    Patient Progress  Patient progress: stable         Procedures

## 2019-12-19 NOTE — ED TRIAGE NOTES
Patient reports being seen at [facility] for lower back pain, cough and congestion last week. Patient given cough medicine and cough medicine. Patient now feeling worse, increased congestion and lower back pain .

## 2020-04-27 RX ORDER — ALBUTEROL SULFATE 90 UG/1
AEROSOL, METERED RESPIRATORY (INHALATION)
Qty: 1 INHALER | Refills: 11 | Status: SHIPPED | OUTPATIENT
Start: 2020-04-27 | End: 2021-05-11 | Stop reason: SDUPTHER

## 2020-11-25 ENCOUNTER — OFFICE VISIT (OUTPATIENT)
Dept: URGENT CARE | Age: 45
End: 2020-11-25

## 2020-11-25 VITALS — RESPIRATION RATE: 16 BRPM | OXYGEN SATURATION: 97 % | TEMPERATURE: 98.5 F | HEART RATE: 72 BPM

## 2021-04-02 ENCOUNTER — NURSE TRIAGE (OUTPATIENT)
Dept: OTHER | Facility: CLINIC | Age: 46
End: 2021-04-02

## 2021-04-05 ENCOUNTER — NURSE TRIAGE (OUTPATIENT)
Dept: OTHER | Facility: CLINIC | Age: 46
End: 2021-04-05
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
## 2021-05-11 ENCOUNTER — OFFICE VISIT (OUTPATIENT)
Dept: INTERNAL MEDICINE CLINIC | Age: 46
End: 2021-05-11
Payer: COMMERCIAL

## 2021-05-11 VITALS
HEART RATE: 68 BPM | TEMPERATURE: 98.1 F | SYSTOLIC BLOOD PRESSURE: 126 MMHG | BODY MASS INDEX: 22.34 KG/M2 | RESPIRATION RATE: 18 BRPM | HEIGHT: 67 IN | DIASTOLIC BLOOD PRESSURE: 85 MMHG | WEIGHT: 142.3 LBS | OXYGEN SATURATION: 98 %

## 2021-05-11 DIAGNOSIS — L30.9 DERMATITIS: ICD-10-CM

## 2021-05-11 DIAGNOSIS — J45.30 MILD PERSISTENT REACTIVE AIRWAY DISEASE WITHOUT COMPLICATION: ICD-10-CM

## 2021-05-11 DIAGNOSIS — L70.0 BLACKHEAD: Primary | ICD-10-CM

## 2021-05-11 DIAGNOSIS — Z12.11 COLON CANCER SCREENING: ICD-10-CM

## 2021-05-11 DIAGNOSIS — Z00.00 PHYSICAL EXAM: ICD-10-CM

## 2021-05-11 PROCEDURE — 99396 PREV VISIT EST AGE 40-64: CPT | Performed by: INTERNAL MEDICINE

## 2021-05-11 PROCEDURE — 99213 OFFICE O/P EST LOW 20 MIN: CPT | Performed by: INTERNAL MEDICINE

## 2021-05-11 RX ORDER — FLUOCINONIDE 0.5 MG/G
OINTMENT TOPICAL 2 TIMES DAILY
Qty: 30 G | Refills: 2 | Status: SHIPPED | OUTPATIENT
Start: 2021-05-11 | End: 2021-07-22

## 2021-05-11 RX ORDER — ALBUTEROL SULFATE 90 UG/1
AEROSOL, METERED RESPIRATORY (INHALATION)
Qty: 1 INHALER | Refills: 11 | Status: SHIPPED | OUTPATIENT
Start: 2021-05-11 | End: 2021-07-22

## 2021-05-11 NOTE — PROGRESS NOTES
1. Have you been to the ER, urgent care clinic since your last visit? Hospitalized since your last visit? No    2. Have you seen or consulted any other health care providers outside of the [address] [place] [address] since your last visit? Include any pap smears or colon screening.  No    Wants to discuss colonoscopy, ankle irritation,bump on clavical

## 2021-05-12 LAB
ALBUMIN SERPL-MCNC: 4.2 G/DL (ref 3.5–5)
ALBUMIN/GLOB SERPL: 1.6 {RATIO} (ref 1.1–2.2)
ALP SERPL-CCNC: 94 U/L (ref 45–117)
ALT SERPL-CCNC: 50 U/L (ref 12–78)
ANION GAP SERPL CALC-SCNC: 6 MMOL/L (ref 5–15)
APPEARANCE UR: CLEAR
AST SERPL-CCNC: 43 U/L (ref 15–37)
BACTERIA URNS QL MICRO: NEGATIVE /HPF
BASOPHILS # BLD: 0 K/UL (ref 0–0.1)
BASOPHILS NFR BLD: 1 % (ref 0–1)
BILIRUB SERPL-MCNC: 0.4 MG/DL (ref 0.2–1)
BILIRUB UR QL: NEGATIVE
BUN SERPL-MCNC: 13 MG/DL (ref 6–20)
BUN/CREAT SERPL: 14 (ref 12–20)
CALCIUM SERPL-MCNC: 8.8 MG/DL (ref 8.5–10.1)
CHLORIDE SERPL-SCNC: 107 MMOL/L (ref 97–108)
CHOLEST SERPL-MCNC: 167 MG/DL
CO2 SERPL-SCNC: 28 MMOL/L (ref 21–32)
COLOR UR: ABNORMAL
CREAT SERPL-MCNC: 0.94 MG/DL (ref 0.7–1.3)
DIFFERENTIAL METHOD BLD: NORMAL
EOSINOPHIL # BLD: 0.1 K/UL (ref 0–0.4)
EOSINOPHIL NFR BLD: 1 % (ref 0–7)
EPITH CASTS URNS QL MICRO: ABNORMAL /LPF
ERYTHROCYTE [DISTWIDTH] IN BLOOD BY AUTOMATED COUNT: 13.8 % (ref 11.5–14.5)
GLOBULIN SER CALC-MCNC: 2.6 G/DL (ref 2–4)
GLUCOSE SERPL-MCNC: 73 MG/DL (ref 65–100)
GLUCOSE UR STRIP.AUTO-MCNC: NEGATIVE MG/DL
HCT VFR BLD AUTO: 41.5 % (ref 36.6–50.3)
HDLC SERPL-MCNC: 71 MG/DL
HDLC SERPL: 2.4 {RATIO} (ref 0–5)
HGB BLD-MCNC: 13.2 G/DL (ref 12.1–17)
HGB UR QL STRIP: ABNORMAL
HYALINE CASTS URNS QL MICRO: ABNORMAL /LPF (ref 0–5)
IMM GRANULOCYTES # BLD AUTO: 0 K/UL (ref 0–0.04)
IMM GRANULOCYTES NFR BLD AUTO: 0 % (ref 0–0.5)
KETONES UR QL STRIP.AUTO: NEGATIVE MG/DL
LDLC SERPL CALC-MCNC: 80 MG/DL (ref 0–100)
LEUKOCYTE ESTERASE UR QL STRIP.AUTO: NEGATIVE
LIPID PROFILE,FLP: NORMAL
LYMPHOCYTES # BLD: 2.8 K/UL (ref 0.8–3.5)
LYMPHOCYTES NFR BLD: 39 % (ref 12–49)
MCH RBC QN AUTO: 31.1 PG (ref 26–34)
MCHC RBC AUTO-ENTMCNC: 31.8 G/DL (ref 30–36.5)
MCV RBC AUTO: 97.6 FL (ref 80–99)
MONOCYTES # BLD: 0.8 K/UL (ref 0–1)
MONOCYTES NFR BLD: 11 % (ref 5–13)
NEUTS SEG # BLD: 3.5 K/UL (ref 1.8–8)
NEUTS SEG NFR BLD: 48 % (ref 32–75)
NITRITE UR QL STRIP.AUTO: NEGATIVE
NRBC # BLD: 0 K/UL (ref 0–0.01)
NRBC BLD-RTO: 0 PER 100 WBC
PH UR STRIP: 6 [PH] (ref 5–8)
PLATELET # BLD AUTO: 248 K/UL (ref 150–400)
PMV BLD AUTO: 10.4 FL (ref 8.9–12.9)
POTASSIUM SERPL-SCNC: 4 MMOL/L (ref 3.5–5.1)
PROT SERPL-MCNC: 6.8 G/DL (ref 6.4–8.2)
PROT UR STRIP-MCNC: NEGATIVE MG/DL
PSA SERPL-MCNC: 1.3 NG/ML (ref 0.01–4)
RBC # BLD AUTO: 4.25 M/UL (ref 4.1–5.7)
RBC #/AREA URNS HPF: ABNORMAL /HPF (ref 0–5)
SODIUM SERPL-SCNC: 141 MMOL/L (ref 136–145)
SP GR UR REFRACTOMETRY: 1.03 (ref 1–1.03)
TRIGL SERPL-MCNC: 80 MG/DL (ref ?–150)
UROBILINOGEN UR QL STRIP.AUTO: 0.2 EU/DL (ref 0.2–1)
VLDLC SERPL CALC-MCNC: 16 MG/DL
WBC # BLD AUTO: 7.3 K/UL (ref 4.1–11.1)
WBC URNS QL MICRO: ABNORMAL /HPF (ref 0–4)

## 2021-05-13 LAB — APO B SERPL-MCNC: 79 MG/DL

## 2021-05-16 NOTE — PROGRESS NOTES
[facility]  [place] [address]  [address]  [facility] [address]  Phone:  [phone]  Fax: [phone]       Chief Complaint   Patient presents with    Annual Exam   .      SUBJECTIVE:    Vandana Washington is a 54 y.o. male comes in for his yearly physical, but he says he is basically doing well. He has been having pain in his right foot specifically the ankle. In reality discomfort is not a musculoskeletal issue, but more of a dermatologic issue, which is not that bad currently because it is not weeping as it previously did. He has a lesion on his scapula related to a blackhead, which he has had for years. There has been no change, but he is ready to have this removed. He also wishes to have a colonoscopy done. He has asthma, which is aggravated by his continued cigarette smoking, although he has reduced to this to a limited extent. Current Outpatient Medications   Medication Sig Dispense Refill    fluocinoNIDE (LIDEX) 0.05 % ointment Apply  to affected area two (2) times a day. 30 g 2    albuterol (ProAir HFA) 90 mcg/actuation inhaler INHALE 2 PUFFS BY MOUTH EVERY 4 HOURS AS NEEDED FOR WHEEZE 1 Inhaler 11    azelastine (ASTELIN) 137 mcg (0.1 %) nasal spray 1 [place] by Both Nostrils route two (2) times a day. Use in each nostril as directed 1 Bottle 0    methylPREDNISolone (MEDROL, LORI,) 4 mg tablet Use as directed 1 Dose Pack 0    cyclobenzaprine (FLEXERIL) 10 mg tablet Take 1 Tab by mouth every eight (8) hours as needed for Muscle Spasm(s).  20 Tab 0     Past Medical History:   Diagnosis Date    Bronchitis     Hematuria      Past Surgical History:   Procedure Laterality Date    HX ADENOIDECTOMY  1978    HX HERNIA REPAIR  1979    HX TONSILLECTOMY  1978     No Known Allergies      REVIEW OF SYSTEMS:  General: negative for - chills or fever  ENT: negative for - headaches, nasal congestion or tinnitus  Respiratory: negative for - cough, hemoptysis, shortness of breath or wheezing  Cardiovascular : negative for - chest pain, edema, palpitations or shortness of breath  Gastrointestinal: negative for - abdominal pain, blood in stools, heartburn or nausea/vomiting  Genito-Urinary: no dysuria, trouble voiding, or hematuria  Musculoskeletal: negative for - gait disturbance, joint pain, joint stiffness or joint swelling  Neurological: no TIA or stroke symptoms  Hematologic: no bruises, no bleeding, no swollen glands  Integument: no lumps, mole changes, nail changes or rash  Endocrine: no malaise/lethargy or unexpected weight changes      Social History     Socioeconomic History    Marital status: SINGLE     Spouse name: Not on file    Number of children: Not on file    Years of education: Not on file    Highest education level: Not on file   Tobacco Use    Smoking status: Current Some Day Smoker     Years: 25.00    Smokeless tobacco: Never Used    Tobacco comment: smokes cigars maybe one or two aday   Substance and Sexual Activity    Alcohol use: Yes     Alcohol/week: 4.0 standard drinks     Types: 2 Cans of beer, 2 Shots of liquor per week     Comment: occ    Drug use: Yes     Types: Marijuana     Comment: QUIT 55    Sexual activity: Yes     Partners: Female     Family History   Problem Relation Age of Onset    Hypertension Mother     Anesth Problems Neg Hx        OBJECTIVE:    Visit Vitals  /85   Pulse 68   Temp 98.1 °F (36.7 °C) (Oral)   Resp 18   Ht 5' 7\" (1.702 m)   Wt 142 lb 4.8 oz (64.5 kg)   SpO2 98%   BMI 22.29 kg/m²     CONSTITUTIONAL: well , well nourished, appears age appropriate  EYES: perrla, eom intact  ENMT:moist mucous membranes, pharynx clear  NECK: supple.  Thyroid normal  RESPIRATORY: Chest: clear to ascultation and percussion   CARDIOVASCULAR: Heart: regular rate and rhythm  GASTROINTESTINAL: Abdomen: soft, bowel sounds active  HEMATOLOGIC: no pathological lymph nodes palpated  MUSCULOSKELETAL: Extremities: no edema, pulse 1+ INTEGUMENT: No unusual rashes or suspicious skin lesions noted. Nails appear normal.  Large blackhead right scapula  NEUROLOGIC: non-focal exam   MENTAL STATUS: alert and oriented, appropriate affect      ASSESSMENT:  1. Blackhead    2. Mild persistent reactive airway disease without complication    3. Dermatitis    4. Physical exam    5. Colon cancer screening        PLAN:  1. As far as his blackhead is concerned, I will send him to a general surgeon for removal.  2. He does have reactive airway disease, and I strongly advised him to discontinue cigarette smoking. He will continue his bronchodilators as needed. Fortunately, the flares up are quite infrequent. 3. For his mild nonspecific dermatitis of his right distal leg, he will be given Lidex ointment applied locally b.i.d.  4. Appointment will be made for him to see a gastroenterologist for his colonoscopy. .  Orders Placed This Encounter    APOLIPOPROTEIN B    CBC WITH AUTOMATED DIFF    LIPID PANEL    METABOLIC PANEL, COMPREHENSIVE    PROSTATE SPECIFIC AG    URINALYSIS W/ RFLX MICROSCOPIC    [facility]    REFERRAL TO GASTROENTEROLOGY    fluocinoNIDE (LIDEX) 0.05 % ointment    albuterol (ProAir HFA) 90 mcg/actuation inhaler         Follow-up and Dispositions    · Return in about 1 year (around 5/11/2022).            Verona Moraes MD

## 2021-05-25 RX ORDER — CEFUROXIME AXETIL 500 MG/1
500 TABLET ORAL 2 TIMES DAILY
Qty: 20 TABLET | Refills: 0 | Status: SHIPPED
Start: 2021-05-25 | End: 2021-06-30

## 2021-05-25 NOTE — PROGRESS NOTES
Tell patient he will need antibiotics because of blood in his urine. Repeat urinalysis after treatment completed. Antibiotic sent to the pharmacy.

## 2021-06-30 ENCOUNTER — OFFICE VISIT (OUTPATIENT)
Dept: URGENT CARE | Age: 46
End: 2021-06-30
Payer: COMMERCIAL

## 2021-06-30 VITALS
RESPIRATION RATE: 16 BRPM | WEIGHT: 140 LBS | OXYGEN SATURATION: 98 % | TEMPERATURE: 98.7 F | DIASTOLIC BLOOD PRESSURE: 93 MMHG | BODY MASS INDEX: 21.93 KG/M2 | SYSTOLIC BLOOD PRESSURE: 132 MMHG | HEART RATE: 83 BPM

## 2021-06-30 DIAGNOSIS — H57.89 IRRITATION OF RIGHT EYE: Primary | ICD-10-CM

## 2021-06-30 PROCEDURE — S9083 URGENT CARE CENTER GLOBAL: HCPCS | Performed by: FAMILY MEDICINE

## 2021-06-30 RX ORDER — NEOMYCIN SULFATE, POLYMYXIN B SULFATE AND DEXAMETHASONE 3.5; 10000; 1 MG/ML; [USP'U]/ML; MG/ML
1 SUSPENSION/ DROPS OPHTHALMIC 4 TIMES DAILY
Qty: 5 ML | Refills: 0 | Status: SHIPPED | OUTPATIENT
Start: 2021-06-30 | End: 2021-07-22

## 2021-06-30 NOTE — PROGRESS NOTES
Eye Problem  This is a new problem. The current episode started more than 2 days ago. The problem occurs constantly. The problem has not changed since onset. Associated symptoms comments: Rt eye irritation- watery- some crusting  Feels like something in eye 3 days before and he rubbed gently and washed with water- no better    No pain . Exacerbated by: blinking. The symptoms are relieved by rest. He has tried nothing for the symptoms. Past Medical History:   Diagnosis Date    Bronchitis     Hematuria         Past Surgical History:   Procedure Laterality Date    HX ADENOIDECTOMY  45    HX HERNIA REPAIR  1979    HX TONSILLECTOMY  1978         Family History   Problem Relation Age of Onset    Hypertension Mother    Walt Martinez Anesth Problems Neg Hx         Social History     Socioeconomic History    Marital status: SINGLE     Spouse name: Not on file    Number of children: Not on file    Years of education: Not on file    Highest education level: Not on file   Occupational History    Not on file   Tobacco Use    Smoking status: Current Some Day Smoker     Years: 25.00    Smokeless tobacco: Never Used    Tobacco comment: smokes cigars maybe one or two aday   Substance and Sexual Activity    Alcohol use: Yes     Alcohol/week: 4.0 standard drinks     Types: 2 Cans of beer, 2 Shots of liquor per week     Comment: occ    Drug use: Yes     Types: Marijuana     Comment: [address] Sexual activity: Yes     Partners: Female   Other Topics Concern    Not on file   Social History Narrative    Not on file     Social Determinants of Health     Financial Resource Strain:     Difficulty of Paying Living Expenses:    Food Insecurity:     Worried About Running Out of Food in the Last Year:     [address] in the Last Year:    Transportation Needs:     Lack of Transportation (Medical):      Lack of Transportation (Non-Medical):    Physical Activity:     Days of Exercise per Week:     Minutes of Exercise per Session:    Stress:     Feeling of Stress :    Social Connections:     Frequency of Communication with Friends and Family:     Frequency of Social Gatherings with Friends and Family:     Attends [employer] Services:     Active Member of Clubs or Organizations:     Attends Club or Organization Meetings:     Marital Status:    Intimate Partner Violence:     Fear of Current or Ex-Partner:     Emotionally Abused:     Physically Abused:     Sexually Abused: ALLERGIES: Patient has no known allergies. Review of Systems   Eyes: Positive for redness and itching. Negative for photophobia, discharge and visual disturbance. All other systems reviewed and are negative. Vitals:    06/30/21 0818   BP: (!) 132/93   Pulse: 83   Resp: 16   Temp: 98.7 °F (37.1 °C)   SpO2: 98%   Weight: 140 lb (63.5 kg)       Physical Exam  Vitals and nursing note reviewed. Eyes:      General: Lids are normal. Lids are everted, no foreign bodies appreciated. Vision grossly intact. Right eye: No foreign body, discharge or hordeolum. Extraocular Movements: Extraocular movements intact. Right eye: Normal extraocular motion. Conjunctiva/sclera:      Right eye: Right conjunctiva is injected (mild ). No hemorrhage. Pupils:      Right eye: No corneal abrasion or fluorescein uptake. Comments: Clear drainage  No exudate           MDM    Procedures      ICD-10-CM ICD-9-CM    1. Irritation of right eye  H57.89 379.99     no corneal abrasion-        Wash with baby shampoo , saline  Use zaditor  Has appointment with optometrist- tomorrow      Medications Ordered Today   Medications    neomycin-polymyxin-dexamethasone (MAXITROL) ophthalmic suspension     Sig: Administer 1 Drop to both eyes four (4) times daily. Dispense:  5 mL     Refill:  0     No results found for any visits on 06/30/21. The patients condition was discussed with the patient and they understand.   The patient is to follow up with primary care doctor. If signs and symptoms become worse the pt is to go to the ER. The patient is to take medications as prescribed.

## 2021-06-30 NOTE — PATIENT INSTRUCTIONS
Pinkeye: Care Instructions  Your Care Instructions     Pinkeye is redness and swelling of the eye surface and the conjunctiva (the lining of the eyelid and the covering of the white part of the eye). Pinkeye is also called conjunctivitis. Pinkeye is often caused by infection with bacteria or a virus. Dry air, allergies, smoke, and chemicals are other common causes. Pinkeye often clears on its own in 7 to 10 days. Antibiotics only help if the pinkeye is caused by bacteria. Pinkeye caused by infection spreads easily. If an allergy or chemical is causing pinkeye, it will not go away unless you can avoid whatever is causing it. Follow-up care is a key part of your treatment and safety. Be sure to make and go to all appointments, and call your doctor if you are having problems. It's also a good idea to know your test results and keep a list of the medicines you take. How can you care for yourself at home? · Wash your hands often. Always wash them before and after you treat pinkeye or touch your eyes or face. · Use moist cotton or a clean, wet cloth to remove crust. Wipe from the inside corner of the eye to the outside. Use a clean part of the cloth for each wipe. · Put cold or warm wet cloths on your eye a few times a day if the eye hurts. · Do not wear contact lenses or eye makeup until the pinkeye is gone. Throw away any eye makeup you were using when you got pinkeye. Clean your contacts and storage case. If you wear disposable contacts, use a new pair when your eye has cleared and it is safe to wear contacts again. · If the doctor gave you antibiotic ointment or eyedrops, use them as directed. Use the medicine for as long as instructed, even if your eye starts looking better soon. Keep the bottle tip clean, and do not let it touch the eye area. · To put in eyedrops or ointment:  ? Tilt your head back, and pull your lower eyelid down with one finger. ?  Drop or squirt the medicine inside the lower lid.  ? Close your eye for 30 to 60 seconds to let the drops or ointment move around. ? Do not touch the ointment or dropper tip to your eyelashes or any other surface. · Do not share towels, pillows, or washcloths while you have pinkeye. When should you call for help? Call your doctor now or seek immediate medical care if:    · You have pain in your eye, not just irritation on the surface.     · You have a change in vision or loss of vision.     · You have an increase in discharge from the eye.     · Your eye has not started to improve or begins to get worse within 48 hours after you start using antibiotics.     · Pinkeye lasts longer than 7 days. Watch closely for changes in your health, and be sure to contact your doctor if you have any problems. Where can you learn more? Go to http://www.gray.com/  Enter Y392 in the search box to learn more about \"Pinkeye: Care Instructions. \"  Current as of: February 26, 2020               Content Version: 12.8  © 2006-2021 [email or web address]. Care instructions adapted under license by [employer] (which disclaims liability or warranty for this information). If you have questions about a medical condition or this instruction, always ask your healthcare professional. [address] any warranty or liability for your use of this information. Corneal Scratches: Care Instructions  Your Care Instructions     The cornea is the clear surface that covers the front of the eye. When a speck of dirt, a wood chip, an insect, or another object flies into your eye, it can cause a painful scratch on the cornea. Wearing contact lenses too long or rubbing your eyes can also scratch the cornea. Small scratches usually heal in a day or two. Deeper scratches may take longer.   If you have had a foreign object removed from your eye or you have a corneal scratch, you will need to watch for infection and vision problems while your eye heals. Follow-up care is a key part of your treatment and safety. Be sure to make and go to all appointments, and call your doctor if you are having problems. It's also a good idea to know your test results and keep a list of the medicines you take. How can you care for yourself at home? · The doctor probably used a medicine during your exam to numb your eye. When it wears off in 30 to 60 minutes, your eye pain may come back. Take pain medicines exactly as directed. ? If the doctor gave you a prescription medicine for pain, take it as prescribed. ? If you are not taking a prescription pain medicine, ask your doctor if you can take an over-the-counter medicine. ? Do not take two or more pain medicines at the same time unless the doctor told you to. Many pain medicines have acetaminophen, which is Tylenol. Too much acetaminophen (Tylenol) can be harmful. · Do not rub your injured eye. Rubbing can make it worse. · Use the prescribed eyedrops or ointment as directed. Be sure the dropper or bottle tip is clean. To put in eyedrops or ointment:  ? Tilt your head back, and pull your lower eyelid down with one finger. ? Drop or squirt the medicine inside the lower lid. ? Close your eye for 30 to 60 seconds to let the drops or ointment move around. ? Do not touch the ointment or dropper tip to your eyelashes or any other surface. · Do not use your contact lens in your hurt eye until your doctor says you can. Also, do not wear eye makeup until your eye has healed. · Do not drive if you have blurred vision. · Bright light may hurt. Sunglasses can help. · To prevent eye injuries in the future, wear safety glasses or goggles when you work with machines or tools, mow the lawn, or ride a bike or motorcycle. When should you call for help? Call your doctor now or seek immediate medical care if:    · You have signs of an eye infection, such as:  ? Pus or thick discharge coming from the eye.  ?  Redness or swelling around the eye.  ? A fever.     · You have new or worse eye pain.     · You have vision changes.     · It feels like there is something in your eye.     · Light hurts your eye. Watch closely for changes in your health, and be sure to contact your doctor if:    · You do not get better as expected. Where can you learn more? Go to http://www.gray.com/  Enter G403 in the search box to learn more about \"Corneal Scratches: Care Instructions. \"  Current as of: August 31, 2020               Content Version: 12.8  © 3878-9384 "[employer]. Care instructions adapted under license by [employer] (which disclaims liability or warranty for this information). If you have questions about a medical condition or this instruction, always ask your healthcare professional. [address] any warranty or liability for your use of this information.

## 2021-07-22 ENCOUNTER — OFFICE VISIT (OUTPATIENT)
Dept: SURGERY | Age: 46
End: 2021-07-22
Payer: COMMERCIAL

## 2021-07-22 VITALS
TEMPERATURE: 98.8 F | HEIGHT: 67 IN | RESPIRATION RATE: 16 BRPM | OXYGEN SATURATION: 97 % | BODY MASS INDEX: 22.44 KG/M2 | SYSTOLIC BLOOD PRESSURE: 135 MMHG | WEIGHT: 143 LBS | HEART RATE: 65 BPM | DIASTOLIC BLOOD PRESSURE: 87 MMHG

## 2021-07-22 DIAGNOSIS — L72.3 SEBACEOUS CYST: Primary | ICD-10-CM

## 2021-07-22 PROCEDURE — 99243 OFF/OP CNSLTJ NEW/EST LOW 30: CPT | Performed by: SURGERY

## 2021-07-22 NOTE — PROGRESS NOTES
HISTORY OF PRESENT ILLNESS  Caio Hernandez is a 54 y.o. male. Works nights in the ER. Present for some time. Getting larger. Rubs against his close causes discomfort        ____________________________________________________________________________  Patient presents with:  Skin Problem: Seen at the request of Dr. Shanthi Fernandez, eval skin lesion right chest, left jaw. /87 (BP 1 Location: Left arm, BP Patient Position: Sitting, BP Cuff Size: Small adult)   Pulse 65   Temp 98.8 °F (37.1 °C) (Temporal)   Resp 16   Ht 5' 7\" (1.702 m)   Wt 64.9 kg (143 lb)   SpO2 97%   BMI 22.40 kg/m²   Past Medical History:  No date: Bronchitis  No date: Hematuria  Past Surgical History:  1978: HX ADENOIDECTOMY  1979: HX HERNIA REPAIR  1978: HX TONSILLECTOMY  Social History    Socioeconomic History      Marital status: SINGLE      Spouse name: Not on file      Number of children: Not on file      Years of education: Not on file      Highest education level: Not on file    Tobacco Use      Smoking status: Current Some Day Smoker        Years: 25.00      Smokeless tobacco: Never Used      Tobacco comment: smokes cigars maybe one or two aday    Vaping Use      Vaping Use: Never used    Substance and Sexual Activity      Alcohol use:  Yes        Alcohol/week: 4.0 standard drinks        Types: 2 Cans of beer, 2 Shots of liquor per week        Comment: occ      Drug use: Yes        Types: Marijuana        Comment: QUIT 1996      Sexual activity: Yes        Partners: Female    Social Determinants of Health  Financial Resource Strain:       Difficulty of Paying Living Expenses:   Food Insecurity:       Worried About Running Out of Food in the Last Year:       Ran Out of Food in the Last Year:   Transportation Needs:       Lack of Transportation (Medical):       Lack of Transportation (Non-Medical):   Physical Activity:       Days of Exercise per Week:       Minutes of Exercise per Session:   Stress:       Feeling of Stress :   Social Connections:       Frequency of Communication with Friends and Family:       Frequency of Social Gatherings with Friends and Family:       Attends Shinto Services:       Active Member of Clubs or Organizations:       Attends Club or Organization Meetings:       Marital Status:   Review of patient's family history indicates:  Problem: Hypertension      Relation: Mother          Age of Onset: (Not Specified)  Problem: Anesth Problems      Relation: Neg Hx          Age of Onset: (Not Specified)    Current Outpatient Medications:  neomycin-polymyxin-dexamethasone (MAXITROL) ophthalmic suspension, Administer 1 Drop to both eyes four (4) times daily. (Patient not taking: Reported on 7/22/2021)  fluocinoNIDE (LIDEX) 0.05 % ointment, Apply  to affected area two (2) times a day. (Patient not taking: Reported on 7/22/2021)  albuterol (ProAir HFA) 90 mcg/actuation inhaler, INHALE 2 PUFFS BY MOUTH EVERY 4 HOURS AS NEEDED FOR WHEEZE (Patient not taking: Reported on 7/22/2021)    No current facility-administered medications for this visit. Allergies: No Known Allergies  _____________________________________________________________________________      Skin Problem  The history is provided by the patient. This is a chronic problem. The current episode started more than 1 week ago. The problem occurs constantly. The problem has been gradually worsening. Pertinent negatives include no chest pain, no abdominal pain, no headaches and no shortness of breath. The treatment provided no relief. Review of Systems   Constitutional: Negative for chills, fever and weight loss. HENT: Negative for ear pain. Eyes: Negative for pain. Respiratory: Negative for shortness of breath. Cardiovascular: Negative for chest pain. Gastrointestinal: Negative for abdominal pain and blood in stool. Genitourinary: Negative for hematuria. Musculoskeletal: Negative for joint pain. Skin: Negative for rash. Neurological: Negative for dizziness, focal weakness, seizures and headaches. Endo/Heme/Allergies: Does not bruise/bleed easily. Psychiatric/Behavioral: The patient does not have insomnia. Physical Exam  Constitutional:       General: He is not in acute distress. Appearance: He is well-developed. HENT:      Head: Normocephalic. Mouth/Throat:      Pharynx: No oropharyngeal exudate. Eyes:      General: No scleral icterus. Pupils: Pupils are equal, round, and reactive to light. Neck:      Trachea: No tracheal deviation. Cardiovascular:      Rate and Rhythm: Normal rate and regular rhythm. Heart sounds: Normal heart sounds. Pulmonary:      Effort: Pulmonary effort is normal.      Breath sounds: Normal breath sounds. No wheezing. Abdominal:      General: There is no distension. Palpations: Abdomen is soft. There is no mass. Tenderness: There is no abdominal tenderness. Hernia: No hernia is present. Musculoskeletal:         General: Normal range of motion. Cervical back: Neck supple. Lymphadenopathy:      Cervical: No cervical adenopathy. Skin:     General: Skin is warm. Findings: No erythema or rash. Neurological:      Mental Status: He is alert and oriented to person, place, and time. Psychiatric:         Behavior: Behavior normal.         ASSESSMENT and PLAN    ICD-10-CM ICD-9-CM    1. Sebaceous cyst  L72.3 706.2        I had an extensive discussion with Audie Ty regarding the risks, benefits, and alternatives of proceeding with a sebaceous cyst excision x2. Risks of surgery including the risk of anesthesia, bleeding, infection, injury to underlying structures, recurrence, and the lack of symptomatic improvement were discussed and he is in agreement to proceed. We will schedule him at his earliest convenience. We will schedule him as early in the day as possible around his work schedule.     Thank you for this consult.

## 2021-07-22 NOTE — Clinical Note
7/22/2021    Patient: Suzette Kimball   YOB: 1975   Date of Visit: 7/22/2021     Lori Zepeda MD  [facility] [address]  [facility] [address] [id number]  [address] [facility] [address]    Dear Lori Zepeda MD,      Thank you for referring Mr. Suzette Kimball to [address] for evaluation. My notes for this consultation are attached. If you have questions, please do not hesitate to call me. I look forward to following your patient along with you.       Sincerely,    Hilda Dela Cruz MD

## 2021-07-26 ENCOUNTER — TRANSCRIBE ORDER (OUTPATIENT)
Dept: NEUROLOGY | Age: 46
End: 2021-07-26

## 2021-07-26 ENCOUNTER — TELEPHONE (OUTPATIENT)
Dept: INTERNAL MEDICINE CLINIC | Age: 46
End: 2021-07-26

## 2021-08-11 ENCOUNTER — OFFICE VISIT (OUTPATIENT)
Dept: SURGERY | Age: 46
End: 2021-08-11
Payer: COMMERCIAL

## 2021-08-11 ENCOUNTER — HOSPITAL ENCOUNTER (OUTPATIENT)
Dept: LAB | Age: 46
Discharge: HOME | End: 2021-08-11

## 2021-08-11 VITALS
DIASTOLIC BLOOD PRESSURE: 78 MMHG | TEMPERATURE: 98.6 F | BODY MASS INDEX: 21.74 KG/M2 | HEART RATE: 71 BPM | SYSTOLIC BLOOD PRESSURE: 126 MMHG | WEIGHT: 138.5 LBS | OXYGEN SATURATION: 98 % | RESPIRATION RATE: 18 BRPM | HEIGHT: 67 IN

## 2021-08-11 DIAGNOSIS — L72.3 SEBACEOUS CYST: Primary | ICD-10-CM

## 2021-08-11 PROCEDURE — 12032 INTMD RPR S/A/T/EXT 2.6-7.5: CPT | Performed by: SURGERY

## 2021-08-11 PROCEDURE — 11403 EXC TR-EXT B9+MARG 2.1-3CM: CPT | Performed by: SURGERY

## 2021-08-11 RX ORDER — LIDOCAINE HYDROCHLORIDE 10 MG/ML
10 INJECTION, SOLUTION EPIDURAL; INFILTRATION; INTRACAUDAL; PERINEURAL ONCE
Qty: 1 VIAL | Refills: 0
Start: 2021-08-11 | End: 2021-08-11

## 2021-08-11 NOTE — PROGRESS NOTES
[facility]  OFFICE PROCEDURE PROGRESS NOTE        Chart reviewed for the following:   Tyesha OCHOA, have reviewed the History, Physical and updated the Allergic reactions for [address] performed immediately prior to start of procedure:   Tyesha OCHOA, have performed the following reviews on Graham Lu prior to the start of the procedure:            * Patient was identified by name and date of birth   * Agreement on procedure being performed was verified  * Risks and Benefits explained to the patient  * Procedure site verified and marked as necessary  * Patient was positioned for comfort  * Consent was signed and verified     Time: 1040am      Date of procedure: 8/11/2021    Procedure performed by:  Marvin Felix MD    Provider assisted by: Tyesha Lucero LPN    Patient assisted by: Self    How tolerated by patient: tolerated well    Post Procedural Pain Scale: 0    Comments: None

## 2021-08-11 NOTE — PROGRESS NOTES
PROCEDURE NOTE    1. Excision of 3 cm sebaceous cyst of the chest  2. Layered closure 3.5 cm      Suzette Kimball is a 51 y.o. male who has been brought to the procedure room for excision of a 3 cm sebaceous cyst located on the chest.    The risks, benefits, and alternatives were explained and consent was obtained for the procedure. The area was sterile prepped and draped in the usual manner. 1% lidocaine with epinephrine was infiltrated into the skin and soft tissue surrounding the lesion. An incision was made. This was a thin walled cyst containing some white caseous material consistent with a sebaceous cyst.  It was sharply dissected free of surrounding tissues and excised in its entirety and sent to pathology. The cyst extended through the superficial fascia but not to the muscle. Hemostasis was noted. The wound was closed with interrupted 3-0 Vicryl in the superficial fascia followed by 4-0 Monocryl in the skin; followed by  Dermabond. Wound care instructions were given. He tolerated the procedure without difficulty. Length of incision: 3.5 cm x 2.5 cm to include minimal margins.

## 2021-08-25 ENCOUNTER — OFFICE VISIT (OUTPATIENT)
Dept: SURGERY | Age: 46
End: 2021-08-25
Payer: COMMERCIAL

## 2021-08-25 VITALS
HEART RATE: 62 BPM | WEIGHT: 139.1 LBS | HEIGHT: 67 IN | OXYGEN SATURATION: 99 % | DIASTOLIC BLOOD PRESSURE: 74 MMHG | BODY MASS INDEX: 21.83 KG/M2 | SYSTOLIC BLOOD PRESSURE: 113 MMHG | RESPIRATION RATE: 18 BRPM | TEMPERATURE: 97.3 F

## 2021-08-25 DIAGNOSIS — L72.3 SEBACEOUS CYST: Primary | ICD-10-CM

## 2021-08-25 PROCEDURE — 99212 OFFICE O/P EST SF 10 MIN: CPT | Performed by: SURGERY

## 2021-08-25 NOTE — PROGRESS NOTES
Pranav Rajan is a 51 y.o. male  HIPAA verified by two patient identifiers. Health Maintenance Due   Topic    Hepatitis C Screening     Pneumococcal 0-64 years (1 of 2 - PPSV23)    Colorectal Cancer Screening Combo      Chief Complaint   Patient presents with    Surgical Follow-up     Visit Vitals  /74   Pulse 62   Temp 97.3 °F (36.3 °C) (Temporal)   Resp 18   Ht 5' 7\" (1.702 m)   Wt 63.1 kg (139 lb 1.6 oz)   SpO2 99%   BMI 21.79 kg/m²       Pain Scale: 0 - No pain/10  Pain Location:   1. Have you been to the ER, urgent care clinic since your last visit? Hospitalized since your last visit? No    2. Have you seen or consulted any other health care providers outside of the [address] [place] [address] since your last visit? Include any pap smears or colon screening.  No

## 2021-08-25 NOTE — PROGRESS NOTES
Chief Complaint   Patient presents with     Sebaceous cyst   S/p excision 8/13/21      Reviewed benign pathology and provided a copy    No drainage or swelling. Physical Exam:   Both incisions clean dry and intact. Dermabond still in place. Doing well  Reviewed incision care to get the Dermabond off and further management after it comes off. Expect this to heal just fine. Follow-up: prn  Expressed understanding of our discussion and agreeable with the plan. I had an extensive and thorough discussion with Pranav Rajan regarding current diagnosis and treatment recommendations. Total time spend with him was 10 minutes.   This included the following:  preparing to see the patient (reviewing prior records and tests),  performing a medically appropriate examination and/or evaluation,  counseling and educating the patient/family/caregiver,  documenting clinical information in the electronic or other health record,  independently interpreting results and communicating results to the patient/family/caregiver,

## 2021-09-01 LAB
CHOLEST SERPL-MCNC: 132 MG/DL
GLUCOSE SERPL-MCNC: 101 MG/DL (ref 65–100)
HDLC SERPL-MCNC: 60 MG/DL
LDLC SERPL CALC-MCNC: 42 MG/DL (ref 0–100)
TRIGL SERPL-MCNC: 150 MG/DL (ref ?–150)

## 2022-03-19 PROBLEM — Z72.0 TOBACCO ABUSE: Status: ACTIVE | Noted: 2017-05-01

## 2022-03-19 PROBLEM — L13.9 BULLOUS DERMATITIS: Status: ACTIVE | Noted: 2017-05-15

## 2022-03-19 PROBLEM — L70.0 BLACKHEAD: Status: ACTIVE | Noted: 2017-05-01

## 2022-03-19 PROBLEM — R23.4 ESCHAR: Status: ACTIVE | Noted: 2017-05-01

## 2022-03-20 PROBLEM — R59.1 LYMPHADENOPATHY: Status: ACTIVE | Noted: 2017-05-01

## 2022-04-30 ENCOUNTER — HOSPITAL ENCOUNTER (EMERGENCY)
Age: 47
Discharge: HOME | End: 2022-04-30
Attending: EMERGENCY MEDICINE
Payer: COMMERCIAL

## 2022-04-30 VITALS
HEIGHT: 68 IN | SYSTOLIC BLOOD PRESSURE: 137 MMHG | HEART RATE: 95 BPM | BODY MASS INDEX: 20.72 KG/M2 | DIASTOLIC BLOOD PRESSURE: 80 MMHG | TEMPERATURE: 98.3 F | WEIGHT: 136.69 LBS | OXYGEN SATURATION: 100 % | RESPIRATION RATE: 16 BRPM
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
## 2022-04-30 DIAGNOSIS — T07.XXXA STRAIN OF MUSCLE OF MULTIPLE SITES: Primary | ICD-10-CM

## 2022-04-30 PROCEDURE — 99283 EMERGENCY DEPT VISIT LOW MDM: CPT

## 2022-04-30 RX ORDER — NAPROXEN 250 MG/1
250 TABLET ORAL 2 TIMES DAILY WITH MEALS
Qty: 14 TABLET | Refills: 0 | Status: SHIPPED | OUTPATIENT
Start: 2022-04-30 | End: 2022-04-30 | Stop reason: SDUPTHER

## 2022-04-30 RX ORDER — NAPROXEN 250 MG/1
250 TABLET ORAL 2 TIMES DAILY WITH MEALS
Qty: 14 TABLET | Refills: 0 | Status: SHIPPED | OUTPATIENT
Start: 2022-04-30 | End: 2022-05-07

## 2022-04-30 RX ORDER — CYCLOBENZAPRINE HCL 10 MG
10 TABLET ORAL
Qty: 15 TABLET | Refills: 0 | Status: SHIPPED | OUTPATIENT
Start: 2022-04-30 | End: 2022-04-30 | Stop reason: SDUPTHER

## 2022-04-30 RX ORDER — CYCLOBENZAPRINE HCL 10 MG
10 TABLET ORAL
Qty: 15 TABLET | Refills: 0 | Status: SHIPPED | OUTPATIENT
Start: 2022-04-30 | End: 2022-05-05
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
## 2022-04-30 NOTE — Clinical Note
[address]  [facility] EMERGENCY DEPT  [address] [address]  [phone]    Work/School Note    Date: 4/30/2022    To Whom It May concern:    Giulia Alford was seen and treated today in the emergency room by the following provider(s):  Attending Provider: Raysa Cuevas MD  Physician Assistant: Nichole Selby, [address]. Giulia Alfodr is excused from work/school on 4/30/2022 through 5/2/2022. He is medically clear to return to work/school on 5/3/2022.          Sincerely,          Angela Paredes, [address]

## 2022-05-04 ENCOUNTER — OFFICE VISIT (OUTPATIENT)
Dept: INTERNAL MEDICINE CLINIC | Age: 47
End: 2022-05-04
Payer: COMMERCIAL

## 2022-05-04 VITALS
BODY MASS INDEX: 21.52 KG/M2 | WEIGHT: 142 LBS | TEMPERATURE: 98.3 F | DIASTOLIC BLOOD PRESSURE: 82 MMHG | SYSTOLIC BLOOD PRESSURE: 132 MMHG | HEIGHT: 68 IN | OXYGEN SATURATION: 98 % | HEART RATE: 61 BPM | RESPIRATION RATE: 16 BRPM

## 2022-05-04 DIAGNOSIS — S50.02XA CONTUSION OF LEFT ELBOW, INITIAL ENCOUNTER: ICD-10-CM

## 2022-05-04 DIAGNOSIS — S33.5XXA LUMBAR SPRAIN, INITIAL ENCOUNTER: Primary | ICD-10-CM

## 2022-05-04 DIAGNOSIS — S60.222A CONTUSION OF LEFT HAND, INITIAL ENCOUNTER: ICD-10-CM

## 2022-05-04 DIAGNOSIS — S13.9XXA NECK SPRAIN, INITIAL ENCOUNTER: ICD-10-CM

## 2022-05-04 PROCEDURE — 99213 OFFICE O/P EST LOW 20 MIN: CPT | Performed by: INTERNAL MEDICINE
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
## 2022-05-09 PROBLEM — S13.9XXA NECK SPRAIN: Status: ACTIVE | Noted: 2022-05-09

## 2022-05-09 PROBLEM — S50.02XA CONTUSION OF LEFT ELBOW: Status: ACTIVE | Noted: 2022-05-09

## 2022-05-09 PROBLEM — S33.5XXA LUMBAR SPRAIN: Status: ACTIVE | Noted: 2022-05-09

## 2022-05-09 PROBLEM — S60.222A CONTUSION OF LEFT HAND: Status: ACTIVE | Noted: 2022-05-09
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
## 2022-05-19 ENCOUNTER — HOSPITAL ENCOUNTER (OUTPATIENT)
Dept: PHYSICAL THERAPY | Age: 47
Discharge: HOME | End: 2022-05-19
Payer: COMMERCIAL

## 2022-05-19 PROCEDURE — 97161 PT EVAL LOW COMPLEX 20 MIN: CPT | Performed by: PHYSICAL THERAPIST

## 2022-05-19 PROCEDURE — 97110 THERAPEUTIC EXERCISES: CPT | Performed by: PHYSICAL THERAPIST
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
## 2022-05-26 ENCOUNTER — HOSPITAL ENCOUNTER (OUTPATIENT)
Dept: PHYSICAL THERAPY | Age: 47
Discharge: HOME | End: 2022-05-26
Payer: COMMERCIAL

## 2022-05-26 PROCEDURE — 97140 MANUAL THERAPY 1/> REGIONS: CPT | Performed by: PHYSICAL THERAPIST

## 2022-05-26 PROCEDURE — 97110 THERAPEUTIC EXERCISES: CPT | Performed by: PHYSICAL THERAPIST

## 2022-05-26 NOTE — PROGRESS NOTES
PT DAILY TREATMENT NOTE - [facility] 2-15    Patient Name: Padmini Fried  Date:2022  : 1975  [x]  Patient  Verified  Payor: José Antonio Stephens / Plan: Rima Mention / Product Type: PPO /    In time:530  Out time:630  Total Treatment Time (min): 60  Total Timed Codes (min): 60  1:1 Treatment Time ( only): 60   Visit #: 2    Treatment Area: Lower back pain [M54.50]  Pain in neck [M54.2]  Left shoulder pain [M25.512]  Left arm pain [M79.602]    SUBJECTIVE  Pain Level (0-10 scale), subjective functional status/changes: Pt reports his pain is 2/10. Pt reports he cont. To have pain in his left forearm. He did walk around the block as was recommended at evaluation. Any medication changes, allergies to medications, adverse drug reactions, diagnosis change, or new procedure performed?: [x] No    [] Yes (see summary sheet for update) SEE ABOVE. OBJECTIVE     palpation    - min Modality:      []  Ice     []  Heat       Position/location:   -   Rationale: decrease pain to improve the patients ability to do functional activities   [x] Skin assessment post-treatment:  [x]intact []redness- no adverse reaction    []redness - adverse reaction:      45 min Therapeutic Exercise:  [x] See flow sheet : added cat camel, TM walking x 7 min, wall slides, puelleys   Rationale: increase strength, improve coordination and increase proprioception to improve the patients ability to heavy lifting, pulling pushing. 15 min Manual Therapy:  STM and TPR to L UT muscle. Manual L UT stretch. Rationale: decrease pain, increase tissue extensibility and decrease trigger points  to improve the patients ability to heavy lifting, pulling, pushing.             With   [] TE   [] TA   [] neuro   [] other: Patient Education: [x] Review HEP    [] Progressed/Changed HEP based on:   [] positioning   [] body mechanics   [] transfers   [] heat/ice application    [] other:        Pain Level (0-10 scale) post treatment: 1-2    ASSESSMENT/Changes in Function:   Pt with decreased pain today. Pt needing some cues to perform HEP from first day correctly. Patient will continue to benefit from skilled PT services to modify and progress therapeutic interventions, address functional mobility deficits, address ROM deficits, address strength deficits, analyze and address soft tissue restrictions, assess and modify postural abnormalities and instruct in home and community integration to attain remaining goals. Progress towards goals / Updated goals:  Goals were not re-assessed today.      PLAN      [x]  Continue plan of care    []  Other:_      Gibson Crespo, PT DPT, OCS 5/26/2022  0:85 PM       PT License #[id number]

## 2022-06-01 ENCOUNTER — HOSPITAL ENCOUNTER (OUTPATIENT)
Dept: PHYSICAL THERAPY | Age: 47
Discharge: HOME | End: 2022-06-01
Payer: COMMERCIAL

## 2022-06-01 PROCEDURE — 97140 MANUAL THERAPY 1/> REGIONS: CPT

## 2022-06-01 PROCEDURE — 97110 THERAPEUTIC EXERCISES: CPT

## 2022-06-01 NOTE — PROGRESS NOTES
PT DAILY TREATMENT NOTE - [facility] 215    Patient Name: Deshawn Au  Date:2022  : 1975  [x]  Patient  Verified  Payor: Lucille Bowling / Plan: Ann-Marie Gramajo / Product Type: PPO /    In time: 10:00 AM  Out time: 11:00 AM  Total Treatment Time (min): 60  Total Timed Codes (min): 60  1:1 Treatment Time ( [address] only): 60   Visit #: 3    Treatment Area: Lower back pain [M54.50]  Pain in neck [M54.2]  Left shoulder pain [M25.512]  Left arm pain [M79.602]    SUBJECTIVE  Pain Level (0-10 scale), subjective functional status/changes: Pt reports his pain is 1-2/10. Pt stated his pain is in his forearm. Pt stated he is eager to return to work. Any medication changes, allergies to medications, adverse drug reactions, diagnosis change, or new procedure performed?: [x] No    [] Yes (see summary sheet for update) SEE ABOVE. OBJECTIVE       - min Modality:      []  Ice     []  Heat       Position/location:   -   Rationale: decrease pain to improve the patients ability to do functional activities   [x] Skin assessment post-treatment:  [x]intact []redness- no adverse reaction    []redness - adverse reaction:      45 min Therapeutic Exercise:  [x] See flow sheet : added bridges     Rationale: increase strength, improve coordination and increase proprioception to improve the patients ability to heavy lifting, pulling pushing. 15 min Manual Therapy:  STM and TPR to L UT muscle. Manual L UT stretch. Rationale: decrease pain, increase tissue extensibility and decrease trigger points  to improve the patients ability to heavy lifting, pulling, pushing. With   [] TE   [] TA   [] neuro   [] other: Patient Education: [x] Review HEP    [] Progressed/Changed HEP based on:   [] positioning   [] body mechanics   [] transfers   [] heat/ice application    [] other:        Pain Level (0-10 scale) post treatment: 0    ASSESSMENT/Changes in Function:   Pt's pain levels continuing to improve. Pt tolerated there-ex without increased symptoms, requiring cues for proper form with cat/camel & scap retractions. Patient will continue to benefit from skilled PT services to modify and progress therapeutic interventions, address functional mobility deficits, address ROM deficits, address strength deficits, analyze and address soft tissue restrictions, assess and modify postural abnormalities and instruct in home and community integration to attain remaining goals. Progress towards goals / Updated goals:  Goals were not re-assessed today.      PLAN      [x]  Continue plan of care    []  Other:_      Mario Cid PTA 6/1/2022  5:56 PM

## 2022-06-03 ENCOUNTER — HOSPITAL ENCOUNTER (OUTPATIENT)
Dept: PHYSICAL THERAPY | Age: 47
Discharge: HOME | End: 2022-06-03
Payer: COMMERCIAL

## 2022-06-03 PROCEDURE — 97110 THERAPEUTIC EXERCISES: CPT

## 2022-06-03 PROCEDURE — 97140 MANUAL THERAPY 1/> REGIONS: CPT

## 2022-06-03 NOTE — PROGRESS NOTES
PT DAILY TREATMENT NOTE - [facility] 2-15    Patient Name: Raman Alvarez  Date:6/3/2022  : 1975  [x]  Patient  Verified  Payor: Kadeem Jama / Plan: JADEDON FORREST [facility] [address] / Product Type: PPO /    In time: 7:05 AM  Out time: 8:00 AM  Total Treatment Time (min): 55  Total Timed Codes (min): 55  1:1 Treatment Time ( [address] only): 55   Visit #: 4    Treatment Area: Lower back pain [M54.50]  Pain in neck [M54.2]  Left shoulder pain [M25.512]  Left arm pain [M79.602]    SUBJECTIVE  Pain Level (0-10 scale), subjective functional status/changes: 0/10 Pt stated he has no pain today but still has intermittent pain in left forearm. Pt is seeing ortho MD on  for forearm. Pt is eager to return to work. Any medication changes, allergies to medications, adverse drug reactions, diagnosis change, or new procedure performed?: [x] No    [] Yes (see summary sheet for update) SEE ABOVE. OBJECTIVE       - min Modality:      []  Ice     []  Heat       Position/location:   -   Rationale: decrease pain to improve the patients ability to do functional activities   [x] Skin assessment post-treatment:  [x]intact []redness- no adverse reaction    []redness - adverse reaction:      45 min Therapeutic Exercise:  [x] See flow sheet : added sh row/ext, core progression, lift off with wall slide     Rationale: increase strength, improve coordination and increase proprioception to improve the patients ability to heavy lifting, pulling pushing. 10 min Manual Therapy:  STM and TPR to L UT muscle. Manual L UT stretch. Rationale: decrease pain, increase tissue extensibility and decrease trigger points  to improve the patients ability to heavy lifting, pulling, pushing.             With   [x] TE   [] TA   [] neuro   [] other: Patient Education: [x] Review HEP    [] Progressed/Changed HEP based on:   [] positioning   [] body mechanics   [] transfers   [] heat/ice application    [] other:        Pain Level (0-10 scale) post treatment: 0    ASSESSMENT/Changes in Function:   Pt demonstrating improvements in pain. Pt required cues for proper form during cat/camel, with decreased thoracic mobility. Pt tolerated progression of there-ex without increased symptoms. Patient will continue to benefit from skilled PT services to modify and progress therapeutic interventions, address functional mobility deficits, address ROM deficits, address strength deficits, analyze and address soft tissue restrictions, assess and modify postural abnormalities and instruct in home and community integration to attain remaining goals. Progress towards goals / Updated goals:  Goals were not re-assessed today.      PLAN      [x]  Continue plan of care    []  Other:_      Andrez King , PTA 6/3/2022  8:00 AM

## 2022-06-06 ENCOUNTER — HOSPITAL ENCOUNTER (OUTPATIENT)
Dept: PHYSICAL THERAPY | Age: 47
Discharge: HOME | End: 2022-06-06
Payer: COMMERCIAL

## 2022-06-06 PROCEDURE — 97140 MANUAL THERAPY 1/> REGIONS: CPT | Performed by: PHYSICAL THERAPIST

## 2022-06-06 PROCEDURE — 97110 THERAPEUTIC EXERCISES: CPT | Performed by: PHYSICAL THERAPIST

## 2022-06-06 NOTE — PROGRESS NOTES
PT DAILY TREATMENT NOTE - [facility] 2-15    Patient Name: Joselyn Walter  Date:2022  : 1975  [x]  Patient  Verified  Payor: Rhianna Covington / Plan: Erick Gan / Product Type: PPO /    In time: 10:15  AM  Out time:  1115   Total Treatment Time (min): 60  Total Timed Codes (min): 60  1:1 Treatment Time ( only): 30    Visit #: 5    Treatment Area: Lower back pain [M54.50]  Pain in neck [M54.2]  Left shoulder pain [M25.512]  Left arm pain [M79.602]    SUBJECTIVE  Pain Level (0-10 scale), subjective functional status/changes: 1/10 pain today, overall he is feeling better. He will see ortho on Wednesday. Any medication changes, allergies to medications, adverse drug reactions, diagnosis change, or new procedure performed?: [x] No    [] Yes (see summary sheet for update) SEE ABOVE. OBJECTIVE       - min Modality:      []  Ice     []  Heat       Position/location:   -   Rationale: decrease pain to improve the patients ability to do functional activities   [x] Skin assessment post-treatment:  [x]intact []redness- no adverse reaction    []redness - adverse reaction:      50 min Therapeutic Exercise:  [x] See flow sheet : added  exercise. Rationale: increase strength, improve coordination and increase proprioception to improve the patients ability to heavy lifting, pulling pushing. 10 min Manual Therapy:  STM and TPR to L UT muscle. Manual L UT stretch. Rationale: decrease pain, increase tissue extensibility and decrease trigger points  to improve the patients ability to heavy lifting, pulling, pushing.             With   [x] TE   [] TA   [] neuro   [] other: Patient Education: [x] Review HEP    [] Progressed/Changed HEP based on:   [] positioning   [] body mechanics   [] transfers   [] heat/ice application    [] other:        Pain Level (0-10 scale) post treatment: 0    ASSESSMENT/Changes in Function:   Pt did well with addition of \"\" exercise for functional movement of a squat with UE diagonal resistance. PT and PTA discussed that we will focus on ther. Ex. For return to work in upcoming visits as pain cont. To be decreased. Patient will continue to benefit from skilled PT services to modify and progress therapeutic interventions, address functional mobility deficits, address ROM deficits, address strength deficits, analyze and address soft tissue restrictions, assess and modify postural abnormalities and instruct in home and community integration to attain remaining goals. Short Term Goals: To be accomplished in 2-3 weeks:              1) Pt independent in HEP from day 1 MET               2) Pt will report he is using a pillow while sitting to support L UE and supporting low back while sitting at home to decrease pain and strain to L neck and L UE. 3) Pt will report he has initiated walking program at least 10 ' 2 x /week to build endurance and for gentle ROM. MET           Long Term Goals: To be accomplished in 4-8 weeks:              1) Pt independent in final HEP.                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

## 2022-06-08 ENCOUNTER — HOSPITAL ENCOUNTER (OUTPATIENT)
Dept: PHYSICAL THERAPY | Age: 47
Discharge: HOME | End: 2022-06-08
Payer: COMMERCIAL

## 2022-06-08 PROCEDURE — 97140 MANUAL THERAPY 1/> REGIONS: CPT

## 2022-06-08 PROCEDURE — 97110 THERAPEUTIC EXERCISES: CPT

## 2022-06-08 NOTE — PROGRESS NOTES
PT DAILY TREATMENT NOTE - [facility] 215    Patient Name: Padmini Fried  Date:2022  : 1975  [x]  Patient  Verified  Payor: José Antonio Stephens / Plan: [facility] [address] / Product Type: PPO /    In time: 10:00  AM  Out time:  11:00 AM    Total Treatment Time (min): 60  Total Timed Codes (min): 60  1:1 Treatment Time ( [address] only): 60    Visit #: 6    Treatment Area: Lower back pain [M54.50]  Pain in neck [M54.2]  Left shoulder pain [M25.512]  Left arm pain [M79.602]    SUBJECTIVE  Pain Level (0-10 scale), subjective functional status/changes: 1-2/10 pain today, 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 Any medication changes, allergies to medications, adverse drug reactions, diagnosis change, or new procedure performed?: [x] No    [] Yes (see summary sheet for update) SEE ABOVE. OBJECTIVE   Cervical AROM  right rotation: 60  left rotation: 55    Extension: 45  Flexion: 45      - min Modality:      []  Ice     []  Heat       Position/location:   -   Rationale: decrease pain to improve the patients ability to do functional activities   [x] Skin assessment post-treatment:  [x]intact []redness- no adverse reaction    []redness - adverse reaction:      50 min Therapeutic Exercise:  [x] See flow sheet : added  exercise. Rationale: increase strength, improve coordination and increase proprioception to improve the patients ability to heavy lifting, pulling pushing. 10 min Manual Therapy:  STM and TPR to L UT muscle. Manual L UT stretch. Rationale: decrease pain, increase tissue extensibility and decrease trigger points  to improve the patients ability to heavy lifting, pulling, pushing.             With   [x] TE   [] TA   [] neuro   [] other: Patient Education: [x] Review HEP    [] Progressed/Changed HEP based on:   [] positioning   [] body mechanics   [] transfers   [] heat/ice application    [] other:        Pain Level (0-10 scale) post treatment: 0    ASSESSMENT/Changes in Function:   Pt has been treated for 6 skilled PT visits and is progressing toward goals. Pt no longer experiencing pain in lower back, but has occasional pain in left neck/ Upper trap. PT focused on work related activities to progress patient for return to work. Pt tolerated progression to work-related exercises. Added push/pull ex, box squat carry to table, and medball diagonals for core control during rotation. Patient will continue to benefit from skilled PT services to modify and progress therapeutic interventions, address functional mobility deficits, address ROM deficits, address strength deficits, analyze and address soft tissue restrictions, assess and modify postural abnormalities and instruct in home and community integration to attain remaining goals. Short Term Goals: To be accomplished in 2-3 weeks:              1) Pt independent in HEP from day 1 MET               2) Pt will report he is using a pillow while sitting to support L UE and supporting low back while sitting at home to decrease pain and strain to L neck and L UE. 3) Pt will report he has initiated walking program at least 10 ' 2 x /week to build endurance and for gentle ROM. MET           Long Term Goals: To be accomplished in 4-8 weeks:              1) Pt independent in final HEP.                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

## 2022-06-09 NOTE — PROGRESS NOTES
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 OBJECTIVE   Cervical AROM  right rotation: 60  left rotation: 55    Extension: 45  Flexion: 45      Pain Level (0-10 scale) post treatment: 0    ASSESSMENT/Changes in Function:   Pt has been treated for 6 skilled PT visits and is progressing toward goals. Pt no longer experiencing pain in lower back, but has occasional pain in left neck/ Upper trap. PT focused on work related activities to progress patient for return to work. Pt tolerated progression to work-related exercises. Added push/pull ex, box squat carry to table, and medball diagonals for core control during rotation. Short Term Goals: To be accomplished in 2-3 weeks:              1) Pt independent in HEP from day 1 MET               2) Pt will report he is using a pillow while sitting to support L UE and supporting low back while sitting at home to decrease pain and strain to L neck and L UE. 3) Pt will report he has initiated walking program at least 10 ' 2 x /week to build endurance and for gentle ROM. MET           Long Term Goals: To be accomplished in 4-8 weeks:              1) Pt independent in final HEP.                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

## 2022-06-10 ENCOUNTER — OFFICE VISIT (OUTPATIENT)
Dept: INTERNAL MEDICINE CLINIC | Age: 47
End: 2022-06-10
Payer: COMMERCIAL

## 2022-06-10 VITALS
TEMPERATURE: 98.3 F | HEIGHT: 68 IN | HEART RATE: 60 BPM | RESPIRATION RATE: 16 BRPM | WEIGHT: 138.3 LBS | BODY MASS INDEX: 20.96 KG/M2 | DIASTOLIC BLOOD PRESSURE: 88 MMHG | OXYGEN SATURATION: 96 % | SYSTOLIC BLOOD PRESSURE: 133 MMHG

## 2022-06-10 DIAGNOSIS — S33.5XXA LUMBAR SPRAIN, INITIAL ENCOUNTER: ICD-10-CM

## 2022-06-10 DIAGNOSIS — S13.9XXA NECK SPRAIN, INITIAL ENCOUNTER: Primary | ICD-10-CM

## 2022-06-10 PROCEDURE — 99213 OFFICE O/P EST LOW 20 MIN: CPT | Performed by: INTERNAL MEDICINE

## 2022-06-10 NOTE — PROGRESS NOTES
Chief Complaint   Patient presents with   [facility]     follow up          1. Have you been to the ER, urgent care clinic since your last visit? Hospitalized since your last visit? No    2. Have you seen or consulted any other health care providers outside of the [address] [place] [address] since your last visit? Include any pap smears or colon screening.  No
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
## 2022-06-13 ENCOUNTER — APPOINTMENT (OUTPATIENT)
Dept: PHYSICAL THERAPY | Age: 47
End: 2022-06-13
Payer: COMMERCIAL

## 2022-06-16 ENCOUNTER — HOSPITAL ENCOUNTER (OUTPATIENT)
Dept: PHYSICAL THERAPY | Age: 47
Discharge: HOME | End: 2022-06-16
Payer: COMMERCIAL

## 2022-06-16 PROCEDURE — 97110 THERAPEUTIC EXERCISES: CPT | Performed by: PHYSICAL THERAPIST

## 2022-06-16 PROCEDURE — 97140 MANUAL THERAPY 1/> REGIONS: CPT | Performed by: PHYSICAL THERAPIST

## 2022-06-16 NOTE — PROGRESS NOTES
PT DAILY TREATMENT NOTE - [facility] 2-15    Patient Name: Sam Wallace  Date:2022  : 1975  [x]  Patient  Verified  Payor: oYav Le / Plan: Clif  / Product Type: PPO /    In time: 930  AM  Out time:  1020 AM    Total Treatment Time (min): 50  Total Timed Codes (min): 50  1:1 Treatment Time [facility] only):50     Visit #: 7    Treatment Area: Lower back pain [M54.50]  Pain in neck [M54.2]  Left shoulder pain [M25.512]  Left arm pain [M79.602]    SUBJECTIVE  Pain Level (0-10 scale), subjective functional status/changes: 1 /10 pain today,  Pt reports he will see ortho this afternoon. Any medication changes, allergies to medications, adverse drug reactions, diagnosis change, or new procedure performed?: [x] No    [] Yes (see summary sheet for update) SEE ABOVE. OBJECTIVE         - min Modality:      []  Ice     []  Heat       Position/location:   -   Rationale: decrease pain to improve the patients ability to do functional activities   [x] Skin assessment post-treatment:  [x]intact []redness- no adverse reaction    []redness - adverse reaction:      40 min Therapeutic Exercise:  [x] See flow sheet :       Rationale: increase strength, improve coordination and increase proprioception to improve the patients ability to heavy lifting, pulling pushing. 10 min Manual Therapy:  STM and TPR to L UT muscle. Manual L UT stretch. Rationale: decrease pain, increase tissue extensibility and decrease trigger points  to improve the patients ability to heavy lifting, pulling, pushing. With   [x] TE   [] TA   [] neuro   [] other: Patient Education: [x] Review HEP    [] Progressed/Changed HEP based on:   [] positioning   [] body mechanics   [] transfers   [] heat/ice application    [] other:        Pain Level (0-10 scale) post treatment: 0    ASSESSMENT/Changes in Function:   Pt did well with box carries today, cues to avoid shoulder shrugging.        Patient will continue to benefit from skilled PT services to modify and progress therapeutic interventions, address functional mobility deficits, address ROM deficits, address strength deficits, analyze and address soft tissue restrictions, assess and modify postural abnormalities and instruct in home and community integration to attain remaining goals. Short Term Goals: To be accomplished in 2-3 weeks:              1) Pt independent in HEP from day 1 MET               2) Pt will report he is using a pillow while sitting to support L UE and supporting low back while sitting at home to decrease pain and strain to L neck and L UE. 3) Pt will report he has initiated walking program at least 10 ' 2 x /week to build endurance and for gentle ROM. MET    Long Term Goals: To be accomplished in 4-8 weeks:              1) Pt independent in final HEP.                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

## 2022-06-20 ENCOUNTER — HOSPITAL ENCOUNTER (OUTPATIENT)
Dept: PHYSICAL THERAPY | Age: 47
Discharge: HOME | End: 2022-06-20
Payer: COMMERCIAL

## 2022-06-20 PROCEDURE — 97140 MANUAL THERAPY 1/> REGIONS: CPT | Performed by: PHYSICAL THERAPIST

## 2022-06-20 PROCEDURE — 97110 THERAPEUTIC EXERCISES: CPT | Performed by: PHYSICAL THERAPIST

## 2022-06-20 NOTE — PROGRESS NOTES
PT Re-eval / DAILY TREATMENT NOTE - [facility] 2-15    Patient Name: Shirley Velasco  Date:2022  : 1975  [x]  Patient  Verified  Payor: Madhu  / Plan: Zenia Crow / Product Type: PPO /    In time: 1000  AM  Out time:  1105 AM    Total Treatment Time (min): 65  Total Timed Codes (min): 60  1:1 Treatment Time [facility] only):60     Visit #: 8    Treatment Area: Lower back pain [M54.50]  Pain in neck [M54.2]  Left shoulder pain [M25.512]  Left arm pain [M79.602]    SUBJECTIVE  Pain Level (0-10 scale), subjective functional status/changes: 1/10 pain today, still aching in left forearm. He f/u with his doctor and he is cleared to go back to work on Monday, no restrictions. Pt reports MRI was also ordered to rule out a pinched nerve in left forearm. Any medication changes, allergies to medications, adverse drug reactions, diagnosis change, or new procedure performed?: [x] No    [] Yes (see summary sheet for update) SEE ABOVE.        OBJECTIVE     CAROM:  FF:  100% no pain  Ext:  100% no pain   R ROT at least 50 deg no pain   R ROT at least 50 deg on pain.      Lumbar Active Movements:  ROM  AROM Comments:pain, area   Forward flexion  Fingers to toes No pain   Extension  Normal motion for age, 100% No change                   SB right Fingers to mid patella No pain    SB left As above No pain.        Strength:     UE strength:  Shoulder shrug 5/5  Shoulder ABD R 5/5, L 5/5  Shoulder flexion R 5/5, L 5/5  Elbow flex/ext R 5/5, L 5/5     Palpation:  mild  TTP left UT, no pain radiating to forearm.      Special tests:    Cervical distraction:  Negative / no change       - min Modality:      []  Ice     []  Heat       Position/location:   -   Rationale: decrease pain to improve the patients ability to do functional activities   [x] Skin assessment post-treatment:  [x]intact []redness- no adverse reaction    []redness - adverse reaction:      55 min Therapeutic Exercise:  [x] See flow sheet : Re-eval as noted above. Box lifts to simulate work duties as well as table pushes with PT on table. Rationale: increase strength, improve coordination and increase proprioception to improve the patients ability to heavy lifting, pulling pushing. 10 min Manual Therapy:  STM and TPR to L UT muscle. Manual L UT stretch. PAIVM's L cervical spine (lower segments) upglide and downglides. Rationale: decrease pain, increase tissue extensibility and decrease trigger points  to improve the patients ability to heavy lifting, pulling, pushing. With   [x] TE   [] TA   [] neuro   [] other: Patient Education: [x] Review HEP    [] Progressed/Changed HEP based on:   [] positioning   [] body mechanics   [] transfers   [] heat/ice application    [] other:        Pain Level (0-10 scale) post treatment: 1/10 left forearm. ASSESSMENT/Changes in Function:   Pt with 8 skilled PT visits from 05/19 through 06/20. Pt shows improved lumbar AROM with less pain as well as improved cervical AROM with less pain. His primary concern is his left forearm which aches and causes intermittent pain. Pt unsure of what triggers this pain. Pt recently returned to MD and was recommended to cont. PT for 6 more weeks. He will return to work Monday. Patient will continue to benefit from skilled PT services to modify and progress therapeutic interventions, address functional mobility deficits, address ROM deficits, address strength deficits, analyze and address soft tissue restrictions, assess and modify postural abnormalities and instruct in home and community integration to attain remaining goals. Short Term Goals:  To be accomplished in 2-3 weeks:              1) Pt independent in HEP from day 1 MET               2) Pt will report he is using a pillow while sitting to support L UE and supporting low back while sitting at home to decrease pain and strain to L neck and L UE.  MET               3) Pt will report he has initiated walking program at least 10 ' 2 x /week to build endurance and for gentle ROM. MET    Long Term Goals: To be accomplished in 4-8 weeks:              1) Pt independent in final HEP.                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

## 2022-06-23 ENCOUNTER — HOSPITAL ENCOUNTER (OUTPATIENT)
Dept: PHYSICAL THERAPY | Age: 47
Discharge: HOME | End: 2022-06-23
Payer: COMMERCIAL

## 2022-06-23 PROCEDURE — 97140 MANUAL THERAPY 1/> REGIONS: CPT | Performed by: PHYSICAL THERAPIST

## 2022-06-23 PROCEDURE — 97110 THERAPEUTIC EXERCISES: CPT | Performed by: PHYSICAL THERAPIST

## 2022-06-23 NOTE — PROGRESS NOTES
PT DAILY TREATMENT NOTE - [facility] 2-15    Patient Name: Cj Guajardo  Date:2022  : 1975  [x]  Patient  Verified  Payor: Tess Babb / Plan: [facility] [address] / Product Type: PPO /    In time: 930  AM  Out time:  1010 am   Total Treatment Time (min): 40  Total Timed Codes (min): 40  1:1 Treatment Time [facility]):40     Visit #: 9    Treatment Area: Lower back pain [M54.50]  Pain in neck [M54.2]  Left shoulder pain [M25.512]  Left arm pain [M79.602]    SUBJECTIVE  Pain Level (0-10 scale), subjective functional status/changes: 0-1/10 pain today, still aching in left forearm on and off. He was sore after the last visit from the new exercises. He goes back to work Monday and he is looking forward to it. Any medication changes, allergies to medications, adverse drug reactions, diagnosis change, or new procedure performed?: [x] No    [] Yes (see summary sheet for update) SEE ABOVE. OBJECTIVE       - min Modality:      []  Ice     []  Heat       Position/location:   -   Rationale: decrease pain to improve the patients ability to do functional activities   [x] Skin assessment post-treatment:  [x]intact []redness- no adverse reaction    []redness - adverse reaction:      30 min Therapeutic Exercise:  [x] See flow sheet :       Rationale: increase strength, improve coordination and increase proprioception to improve the patients ability to heavy lifting, pulling pushing. 10 min Manual Therapy:  STM and TPR to L UT muscle. Manual L UT stretch. Inferior / medial 1st rib mob on the L, slight elevation noted. Rationale: decrease pain, increase tissue extensibility and decrease trigger points  to improve the patients ability to heavy lifting, pulling, pushing.             With   [x] TE   [] TA   [] neuro   [] other: Patient Education: [x] Review HEP    [] Progressed/Changed HEP based on:   [] positioning   [] body mechanics   [] transfers   [] heat/ice application    [] other: Pain Level (0-10 scale) post treatment: 0-1/10 left forearm. ASSESSMENT/Changes in Function:   Pt without inc. Pain throughout session, some cues needed to avoid shoulder shrug at times with prone ther. Ex. Patient will continue to benefit from skilled PT services to modify and progress therapeutic interventions, address functional mobility deficits, address ROM deficits, address strength deficits, analyze and address soft tissue restrictions, assess and modify postural abnormalities and instruct in home and community integration to attain remaining goals. Short Term Goals: To be accomplished in 2-3 weeks:              1) Pt independent in HEP from day 1 MET               2) Pt will report he is using a pillow while sitting to support L UE and supporting low back while sitting at home to decrease pain and strain to L neck and L UE.  MET               3) Pt will report he has initiated walking program at least 10 ' 2 x /week to build endurance and for gentle ROM. MET    Long Term Goals: To be accomplished in 4-8 weeks:              1) Pt independent in final HEP.                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

## 2022-06-24 ENCOUNTER — HOSPITAL ENCOUNTER (EMERGENCY)
Age: 47
Discharge: HOME | End: 2022-06-24
Attending: EMERGENCY MEDICINE
Payer: COMMERCIAL

## 2022-06-24 ENCOUNTER — APPOINTMENT (OUTPATIENT)
Dept: GENERAL RADIOLOGY | Age: 47
End: 2022-06-24
Attending: EMERGENCY MEDICINE
Payer: COMMERCIAL

## 2022-06-24 VITALS
TEMPERATURE: 98.3 F | OXYGEN SATURATION: 97 % | SYSTOLIC BLOOD PRESSURE: 154 MMHG | HEART RATE: 68 BPM | RESPIRATION RATE: 18 BRPM | DIASTOLIC BLOOD PRESSURE: 94 MMHG

## 2022-06-24 DIAGNOSIS — Z04.1 ENCOUNTER FOR EXAMINATION FOLLOWING MOTOR VEHICLE ACCIDENT (MVA): Primary | ICD-10-CM

## 2022-06-24 DIAGNOSIS — M54.2 MUSCULOSKELETAL NECK PAIN: ICD-10-CM

## 2022-06-24 PROCEDURE — 74011000250 HC RX REV CODE- 250: Performed by: EMERGENCY MEDICINE

## 2022-06-24 PROCEDURE — 99284 EMERGENCY DEPT VISIT MOD MDM: CPT

## 2022-06-24 PROCEDURE — 96372 THER/PROPH/DIAG INJ SC/IM: CPT

## 2022-06-24 PROCEDURE — 74011250637 HC RX REV CODE- 250/637: Performed by: EMERGENCY MEDICINE

## 2022-06-24 PROCEDURE — 74011250636 HC RX REV CODE- 250/636: Performed by: EMERGENCY MEDICINE

## 2022-06-24 PROCEDURE — 72050 X-RAY EXAM NECK SPINE 4/5VWS: CPT

## 2022-06-24 RX ORDER — KETOROLAC TROMETHAMINE 30 MG/ML
30 INJECTION, SOLUTION INTRAMUSCULAR; INTRAVENOUS ONCE
Status: COMPLETED | OUTPATIENT
Start: 2022-06-24 | End: 2022-06-24

## 2022-06-24 RX ORDER — LIDOCAINE 4 G/100G
1 PATCH TOPICAL EVERY 24 HOURS
Status: DISCONTINUED | OUTPATIENT
Start: 2022-06-24 | End: 2022-06-24 | Stop reason: HOSPADM

## 2022-06-24 RX ORDER — GABAPENTIN 100 MG/1
100 CAPSULE ORAL ONCE
Status: COMPLETED | OUTPATIENT
Start: 2022-06-24 | End: 2022-06-24

## 2022-06-24 RX ADMIN — KETOROLAC TROMETHAMINE 30 MG: 30 INJECTION, SOLUTION INTRAMUSCULAR at 08:16

## 2022-06-24 RX ADMIN — GABAPENTIN 100 MG: 100 CAPSULE ORAL at 08:16

## 2022-06-24 NOTE — Clinical Note
[address]  [address] [facility] [address]  [phone]    Work/School Note    Date: 6/24/2022    To Whom It May concern:    Fanta Lopez was seen and treated today in the emergency room by the following provider(s):  Attending Provider: Anita Luna MD.      Fanta Lopez is excused from work/school on 06/24/22 and 06/25/22. He is medically clear to return to work/school on 6/26/2022.        Sincerely,          Dragan Lyman MD

## 2022-06-24 NOTE — LETTER
Joselyn Walter was seen and treated in our emergency department on 6/24/2022 by the following provider(s):  Attending Provider: Ramirez Graham MD.    He may return to work on 6/28/2022. If you have any questions or concerns, please don't hesitate to call.       Sincerely,      Luis F Lockett RN

## 2022-06-24 NOTE — Clinical Note
[address]  [address] [facility] [address]  [phone]    Work/School Note    Date: 6/24/2022    To Whom It May concern:    Gumaro Aguayo was seen and treated today in the emergency room by the following provider(s):  Attending Provider: Lucinda Gutierrez MD.      Gumaro Aguayo is excused from work/school on 06/24/22 and 06/25/22. He is medically clear to return to work/school on 6/26/2022.        Sincerely,          Bernardo Tatum MD
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
## 2022-06-27 ENCOUNTER — PATIENT OUTREACH (OUTPATIENT)
Dept: OTHER | Age: 47
End: 2022-06-27

## 2022-06-27 NOTE — PROGRESS NOTES
Verified  and address for HIPAA security. Introduced [employer] for patient. Patient does not identify any Care Management needs at this time and declines services. *Spoke with patient who reports that he is doing ok. Will RTW on tomorrow 22. Patient is already going to PT. PCP Follow Up Appointment is Thursday, 22 @ 12:30p. Patient states that he is doing well enough that additional support is not needed. Patient had no other questions or concerns. Contact information provided and reminded him that I can be reached if any future needs arise.

## 2022-06-28 ENCOUNTER — HOSPITAL ENCOUNTER (OUTPATIENT)
Dept: PHYSICAL THERAPY | Age: 47
Discharge: HOME | End: 2022-06-28
Payer: COMMERCIAL

## 2022-06-28 PROCEDURE — 97110 THERAPEUTIC EXERCISES: CPT
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
## 2022-06-30 ENCOUNTER — APPOINTMENT (OUTPATIENT)
Dept: PHYSICAL THERAPY | Age: 47
End: 2022-06-30
Payer: COMMERCIAL

## 2022-06-30 ENCOUNTER — OFFICE VISIT (OUTPATIENT)
Dept: INTERNAL MEDICINE CLINIC | Age: 47
End: 2022-06-30
Payer: COMMERCIAL

## 2022-06-30 VITALS
HEIGHT: 68 IN | TEMPERATURE: 98.5 F | SYSTOLIC BLOOD PRESSURE: 147 MMHG | WEIGHT: 139 LBS | DIASTOLIC BLOOD PRESSURE: 93 MMHG | HEART RATE: 78 BPM | OXYGEN SATURATION: 96 % | BODY MASS INDEX: 21.07 KG/M2 | RESPIRATION RATE: 16 BRPM

## 2022-06-30 DIAGNOSIS — S33.5XXA LUMBAR SPRAIN, INITIAL ENCOUNTER: Primary | ICD-10-CM

## 2022-06-30 DIAGNOSIS — S13.9XXA NECK SPRAIN, INITIAL ENCOUNTER: ICD-10-CM

## 2022-06-30 DIAGNOSIS — T14.8XXA BRUISE: ICD-10-CM

## 2022-06-30 PROCEDURE — 99213 OFFICE O/P EST LOW 20 MIN: CPT | Performed by: INTERNAL MEDICINE

## 2022-06-30 RX ORDER — IBUPROFEN 800 MG/1
800 TABLET ORAL
COMMUNITY

## 2022-06-30 RX ORDER — NAPROXEN 500 MG/1
500 TABLET ORAL 2 TIMES DAILY WITH MEALS
COMMUNITY
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
## 2022-07-01 PROBLEM — T14.8XXA BRUISE: Status: ACTIVE | Noted: 2022-07-01
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
## 2022-07-05 ENCOUNTER — HOSPITAL ENCOUNTER (OUTPATIENT)
Dept: PHYSICAL THERAPY | Age: 47
Discharge: HOME | End: 2022-07-05
Payer: COMMERCIAL

## 2022-07-05 PROCEDURE — 97140 MANUAL THERAPY 1/> REGIONS: CPT

## 2022-07-05 PROCEDURE — 97110 THERAPEUTIC EXERCISES: CPT
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
## 2022-07-07 ENCOUNTER — HOSPITAL ENCOUNTER (OUTPATIENT)
Dept: PHYSICAL THERAPY | Age: 47
Discharge: HOME | End: 2022-07-07
Payer: COMMERCIAL

## 2022-07-07 PROCEDURE — 97110 THERAPEUTIC EXERCISES: CPT
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
## 2022-07-11 ENCOUNTER — HOSPITAL ENCOUNTER (OUTPATIENT)
Dept: PHYSICAL THERAPY | Age: 47
Discharge: HOME | End: 2022-07-11
Payer: COMMERCIAL

## 2022-07-11 PROCEDURE — 97110 THERAPEUTIC EXERCISES: CPT | Performed by: PHYSICAL THERAPIST

## 2022-07-11 PROCEDURE — 97140 MANUAL THERAPY 1/> REGIONS: CPT | Performed by: PHYSICAL THERAPIST
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
## 2022-07-14 ENCOUNTER — APPOINTMENT (OUTPATIENT)
Dept: PHYSICAL THERAPY | Age: 47
End: 2022-07-14
Payer: COMMERCIAL

## 2022-07-15 ENCOUNTER — HOSPITAL ENCOUNTER (OUTPATIENT)
Dept: PHYSICAL THERAPY | Age: 47
Discharge: HOME | End: 2022-07-15
Payer: COMMERCIAL

## 2022-07-15 PROCEDURE — 97140 MANUAL THERAPY 1/> REGIONS: CPT

## 2022-07-15 PROCEDURE — 97110 THERAPEUTIC EXERCISES: CPT
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
## 2022-07-19 ENCOUNTER — HOSPITAL ENCOUNTER (OUTPATIENT)
Dept: PHYSICAL THERAPY | Age: 47
Discharge: HOME | End: 2022-07-19
Payer: COMMERCIAL

## 2022-07-19 PROCEDURE — 97110 THERAPEUTIC EXERCISES: CPT
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
## 2022-07-21 ENCOUNTER — HOSPITAL ENCOUNTER (OUTPATIENT)
Dept: PHYSICAL THERAPY | Age: 47
Discharge: HOME | End: 2022-07-21
Payer: COMMERCIAL

## 2022-07-21 PROCEDURE — 97110 THERAPEUTIC EXERCISES: CPT
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
## 2022-07-25 ENCOUNTER — HOSPITAL ENCOUNTER (OUTPATIENT)
Dept: PHYSICAL THERAPY | Age: 47
Discharge: HOME | End: 2022-07-25
Payer: COMMERCIAL

## 2022-07-25 PROCEDURE — 97110 THERAPEUTIC EXERCISES: CPT | Performed by: PHYSICAL THERAPIST

## 2022-07-25 NOTE — PROGRESS NOTES
PT DAILY TREATMENT NOTE - [facility] 2-15    Patient Name: Rulon Mcburney  Date:2022  : 1975  [x]  Patient  Verified  Payor: Mari Rodriguez / Plan: [employer] [facility] [address] / Product Type: PPO /    In time: 8:25 AM  Out time: 920  AM   Total Treatment Time (min): 55   Total Timed Codes (min): 45  1:1 Treatment Time ( [address] only): 45  Visit #: 17    Treatment Area: Lower back pain [M54.50]  Pain in neck [M54.2]  Left shoulder pain [M25.512]  Left arm pain [M79.602]    SUBJECTIVE  Pain Level (0-10 scale), subjective functional status/changes:  0/10 pain. Pt reports no pain in the past week. Now and then his elbow may \"bother\" him. He denies tingling or any inc. Pain. Pt reports he recently lost his father and will need to change appt. On Thursday. Any medication changes, allergies to medications, adverse drug reactions, diagnosis change, or new procedure performed?: [x] No    [] Yes (see summary sheet for update) SEE ABOVE. OBJECTIVE       10 min Modality:      []  Ice     [x]  Heat       Position/location:  Supine, back   Rationale: decrease pain to improve the patients ability to do functional activities   [x] Skin assessment post-treatment:  [x]intact []redness- no adverse reaction    []redness - adverse reaction:      45 min Therapeutic Exercise:  [x] See flow sheet : progressed: per flow sheet, added resistance for standing \"\" gave cues for correct body mechanicsfor lifting. Rationale: increase strength, improve coordination and increase proprioception to improve the patients ability to heavy lifting, pulling pushing. decl. min Manual Therapy:  STM and TPR to L UT muscle. OMIT TODAY:  Manual L UT stretch. Inferior / medial 1st rib mob on the L, slight elevation noted. Rationale: decrease pain, increase tissue extensibility and decrease trigger points  to improve the patients ability to heavy lifting, pulling, pushing.             With   [x] TE   [] TA [] neuro   [] other: Patient Education: [x] Review HEP    [] Progressed/Changed HEP based on:   [] positioning   [] body mechanics   [] transfers   [] heat/ice application    [] other:        Pain Level (0-10 scale) post treatment: 0     ASSESSMENT/Changes in Function:   Pt did well with progression of ther. Ex. As noted above. Pt reporting his father recently passed away so will need to change his appointment on Thursday. Patient will continue to benefit from skilled PT services to modify and progress therapeutic interventions, address functional mobility deficits, address ROM deficits, address strength deficits, analyze and address soft tissue restrictions, assess and modify postural abnormalities and instruct in home and community integration to attain remaining goals. Short Term Goals: To be accomplished in 2-3 weeks:              1) Pt independent in HEP from day 1 MET               2) Pt will report he is using a pillow while sitting to support L UE and supporting low back while sitting at home to decrease pain and strain to L neck and L UE.  MET               3) Pt will report he has initiated walking program at least 10 ' 2 x /week to build endurance and for gentle ROM. MET    Long Term Goals: To be accomplished in 4-8 weeks:              1) Pt independent in final HEP. 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     Pt reports Aug. 5th he will see pain management specialist.      Tonia Rodriguez, PT,  7/25/2022

## 2022-07-28 ENCOUNTER — APPOINTMENT (OUTPATIENT)
Dept: PHYSICAL THERAPY | Age: 47
End: 2022-07-28
Payer: COMMERCIAL

## 2022-07-29 ENCOUNTER — APPOINTMENT (OUTPATIENT)
Dept: PHYSICAL THERAPY | Age: 47
End: 2022-07-29
Payer: COMMERCIAL

## 2022-08-01 ENCOUNTER — HOSPITAL ENCOUNTER (OUTPATIENT)
Dept: PHYSICAL THERAPY | Age: 47
Discharge: HOME | End: 2022-08-01
Payer: COMMERCIAL

## 2022-08-01 PROCEDURE — 97110 THERAPEUTIC EXERCISES: CPT | Performed by: PHYSICAL THERAPIST

## 2022-08-01 NOTE — PROGRESS NOTES
PT DAILY TREATMENT NOTE - [facility] 2-15    Patient Name: Arnaldo Cam  Date:2022  : 1975  [x]  Patient  Verified  Payor: Margaret Josephort / Plan: [facility] [address] / Product Type: PPO /    In time: 8:30 AM  Out time:920  AM   Total Treatment Time (min):  50  Total Timed Codes (min): 40  1:1 Treatment Time [facility]):40   Visit #: 18    Treatment Area: Lower back pain [M54.50]  Pain in neck [M54.2]  Left shoulder pain [M25.512]  Left arm pain [M79.602]    SUBJECTIVE  Pain Level (0-10 scale), subjective functional status/changes:  0/10 pain. Pt reports that he will f/u with pain specialist Dr. Rosa Maria Multani Friday and will be planning to return to work 5 hr / day next week. He has been feeling better / no pain so he doesn't think he will need a cortisone injection. Any medication changes, allergies to medications, adverse drug reactions, diagnosis change, or new procedure performed?: [x] No    [] Yes (see summary sheet for update) SEE ABOVE. OBJECTIVE       10 min Modality:      []  Ice     [x]  Heat       Position/location:  Supine, back   Rationale: decrease pain to improve the patients ability to do functional activities   [x] Skin assessment post-treatment:  [x]intact []redness- no adverse reaction    []redness - adverse reaction:      40 min Therapeutic Exercise:  [x] See flow sheet : progressed: per flow sheet    Rationale: increase strength, improve coordination and increase proprioception to improve the patients ability to heavy lifting, pulling pushing. decl. min Manual Therapy:  STM and TPR to L UT muscle. OMIT TODAY:  Manual L UT stretch. Inferior / medial 1st rib mob on the L, slight elevation noted. Rationale: decrease pain, increase tissue extensibility and decrease trigger points  to improve the patients ability to heavy lifting, pulling, pushing.             With   [x] TE   [] TA   [] neuro   [] other: Patient Education: [x] Review HEP    [] Progressed/Changed HEP based on:   [] positioning   [] body mechanics   [] transfers   [] heat/ice application    [] other:        Pain Level (0-10 scale) post treatment: 0     ASSESSMENT/Changes in Function:   Pt without pain today, able to increase in resistance as well. Patient will continue to benefit from skilled PT services to modify and progress therapeutic interventions, address functional mobility deficits, address ROM deficits, address strength deficits, analyze and address soft tissue restrictions, assess and modify postural abnormalities and instruct in home and community integration to attain remaining goals. Short Term Goals: To be accomplished in 2-3 weeks:              1) Pt independent in HEP from day 1 MET               2) Pt will report he is using a pillow while sitting to support L UE and supporting low back while sitting at home to decrease pain and strain to L neck and L UE.  MET               3) Pt will report he has initiated walking program at least 10 ' 2 x /week to build endurance and for gentle ROM. MET    Long Term Goals: To be accomplished in 4-8 weeks:              1) Pt independent in final HEP. 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     Pt reports Aug. 5th he will see pain management specialist.      Quyen Robb, PT,  8/1/2022

## 2022-08-04 ENCOUNTER — HOSPITAL ENCOUNTER (OUTPATIENT)
Dept: PHYSICAL THERAPY | Age: 47
Discharge: HOME | End: 2022-08-04
Payer: COMMERCIAL

## 2022-08-04 PROCEDURE — 97110 THERAPEUTIC EXERCISES: CPT | Performed by: PHYSICAL THERAPY ASSISTANT

## 2022-08-04 NOTE — PROGRESS NOTES
PT DAILY TREATMENT NOTE - [facility] 2-15    Patient Name: Melvin Guzman  Date:2022  : 1975  [x]  Patient  Verified  Payor: Bill Matt / Plan: [employer] [address] / Product Type: PPO /    In time: 8:00 AM  Out time:900  AM   Total Treatment Time (min):  60  Total Timed Codes (min): 50  1:1 Treatment Time [facility]):38   Visit #: 19    Treatment Area: Lower back pain [M54.50]  Pain in neck [M54.2]  Left shoulder pain [M25.512]  Left arm pain [M79.602]    SUBJECTIVE  Pain Level (0-10 scale), subjective functional status/changes:  0/10 pain. Pt reports the sees Dr. Joe Trujillo tomorrow \"but I don't think I need it. \" States he is doing well with his HEP and feels excited to return to work 5 hours/day. Any medication changes, allergies to medications, adverse drug reactions, diagnosis change, or new procedure performed?: [x] No    [] Yes (see summary sheet for update) SEE ABOVE. OBJECTIVE       10 min Modality:      []  Ice     [x]  Heat       Position/location:  Supine, back   Rationale: decrease pain to improve the patients ability to do functional activities   [x] Skin assessment post-treatment:  [x]intact []redness- no adverse reaction    []redness - adverse reaction:      50 min Therapeutic Exercise:  [x] See flow sheet : progressed: per flow sheet    Rationale: increase strength, improve coordination and increase proprioception to improve the patients ability to heavy lifting, pulling pushing. decl. min Manual Therapy:  STM and TPR to L UT muscle. OMIT TODAY:  Manual L UT stretch. Inferior / medial 1st rib mob on the L, slight elevation noted. Rationale: decrease pain, increase tissue extensibility and decrease trigger points  to improve the patients ability to heavy lifting, pulling, pushing.             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 Patient able to keep his core engaged and avoid excessive lumbar extension. Overall he is progressing very well. Patient will continue to benefit from skilled PT services to modify and progress therapeutic interventions, address functional mobility deficits, address ROM deficits, address strength deficits, analyze and address soft tissue restrictions, assess and modify postural abnormalities and instruct in home and community integration to attain remaining goals. Short Term Goals: To be accomplished in 2-3 weeks:              1) Pt independent in HEP from day 1 MET               2) Pt will report he is using a pillow while sitting to support L UE and supporting low back while sitting at home to decrease pain and strain to L neck and L UE.  MET               3) Pt will report he has initiated walking program at least 10 ' 2 x /week to build endurance and for gentle ROM. MET    Long Term Goals: To be accomplished in 4-8 weeks:              1) Pt independent in final HEP. 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     Pt reports Aug. 5th he will see pain management specialist.      Roseline Rutledge, PTA,  8/4/2022

## 2022-08-09 ENCOUNTER — HOSPITAL ENCOUNTER (OUTPATIENT)
Dept: PHYSICAL THERAPY | Age: 47
Discharge: HOME | End: 2022-08-09
Payer: COMMERCIAL

## 2022-08-09 PROCEDURE — 97110 THERAPEUTIC EXERCISES: CPT | Performed by: PHYSICAL THERAPY ASSISTANT

## 2022-08-09 NOTE — PROGRESS NOTES
PT DAILY TREATMENT NOTE - [facility] 2-15    Patient Name: Yong Dee  Date:2022  : 1975  [x]  Patient  Verified  Payor: Karl Garcia / Plan: [employer] [address] / Product Type: PPO /    In time: 8:00 AM  Out time:900  AM   Total Treatment Time (min):  60  Total Timed Codes (min): 50  1:1 Treatment Time [facility] only):15   Visit #: 20    Treatment Area: Lower back pain [M54.50]  Pain in neck [M54.2]  Left shoulder pain [M25.512]  Left arm pain [M79.602]    SUBJECTIVE  Pain Level (0-10 scale), subjective functional status/changes:  0/10 pain. Pt reports the sees Dr. Jahaira Nguyen last week and was put on 10 pound weight restrictions for return to work. He starts work tonight x5 hours. He has a follow up appointment at the end of August.    Any medication changes, allergies to medications, adverse drug reactions, diagnosis change, or new procedure performed?: [x] No    [] Yes (see summary sheet for update) SEE ABOVE. OBJECTIVE       10 min Modality:      []  Ice     [x]  Heat       Position/location:  Supine, back   Rationale: decrease pain to improve the patients ability to do functional activities   [x] Skin assessment post-treatment:  [x]intact []redness- no adverse reaction    []redness - adverse reaction:      50 min Therapeutic Exercise:  [x] See flow sheet : progressed: per flow sheet    Rationale: increase strength, improve coordination and increase proprioception to improve the patients ability to heavy lifting, pulling pushing. decl. min Manual Therapy:  STM and TPR to L UT muscle. OMIT TODAY:  Manual L UT stretch. Inferior / medial 1st rib mob on the L, slight elevation noted. Rationale: decrease pain, increase tissue extensibility and decrease trigger points  to improve the patients ability to heavy lifting, pulling, pushing.             With   [x] TE   [] TA   [] neuro   [] other: Patient Education: [x] Review HEP    [] Progressed/Changed HEP based on:   [] positioning [] body mechanics   [] transfers   [] heat/ice application    [] other:        Pain Level (0-10 scale) post treatment: 0     ASSESSMENT/Changes in Function:   Patient with good form/technique with exercises throughout session. Patient will continue to benefit from skilled PT services to modify and progress therapeutic interventions, address functional mobility deficits, address ROM deficits, address strength deficits, analyze and address soft tissue restrictions, assess and modify postural abnormalities and instruct in home and community integration to attain remaining goals. Short Term Goals: To be accomplished in 2-3 weeks:              1) Pt independent in HEP from day 1 MET               2) Pt will report he is using a pillow while sitting to support L UE and supporting low back while sitting at home to decrease pain and strain to L neck and L UE.  MET               3) Pt will report he has initiated walking program at least 10 ' 2 x /week to build endurance and for gentle ROM. MET    Long Term Goals: To be accomplished in 4-8 weeks:              1) Pt independent in final HEP. 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     Pt reports Aug. 5th he will see pain management specialist.      Katelyn Reyes PTA,  8/9/2022

## 2022-08-16 ENCOUNTER — HOSPITAL ENCOUNTER (OUTPATIENT)
Dept: PHYSICAL THERAPY | Age: 47
Discharge: HOME | End: 2022-08-16
Payer: COMMERCIAL

## 2022-08-16 PROCEDURE — 97110 THERAPEUTIC EXERCISES: CPT | Performed by: PHYSICAL THERAPIST

## 2022-08-16 NOTE — PROGRESS NOTES
PT DAILY TREATMENT NOTE - [facility] 2-15    Patient Name: Art Shaffer  Date:2022  : 1975  [x]  Patient  Verified  Payor: Piper Shaw / Plan: Genella Seals / Product Type: PPO /    In time: 930 AM  Out time: 1025  AM   Total Treatment Time (min):  55  Total Timed Codes (min): 45  1:1 Treatment Time [facility] only):38    Visit #: 21    Treatment Area: Lower back pain [M54.50]  Pain in neck [M54.2]  Left shoulder pain [M25.512]  Left arm pain [M79.602]    SUBJECTIVE  Pain Level (0-10 scale), subjective functional status/changes:  0/10 pain but has some discomfort in his low back. Pt reports went back to work last Tuesday, worked 3 days, 5 hr / day. He is working [employer] this week 6 hr / day. Pt is working the night shift. He ended up doing a bit more last week when someone quit and was feeling some more discomfort in his back. He is still on restrictions of 10 lb or less. Any medication changes, allergies to medications, adverse drug reactions, diagnosis change, or new procedure performed?: [x] No    [] Yes (see summary sheet for update) SEE ABOVE. OBJECTIVE       10 min Modality:      []  Ice     [x]  Heat       Position/location:  Supine, back   Rationale: decrease pain to improve the patients ability to do functional activities   [x] Skin assessment post-treatment:  [x]intact []redness- no adverse reaction    []redness - adverse reaction:      45 min Therapeutic Exercise:  [x] See flow sheet : progressed: per flow sheet   Added wall push up. Progressed C.C. resisted walking to 55 lb. Rationale: increase strength, improve coordination and increase proprioception to improve the patients ability to heavy lifting, pulling pushing. decl. min Manual Therapy:  STM and TPR to L UT muscle. OMIT TODAY:  Manual L UT stretch. Inferior / medial 1st rib mob on the L, slight elevation noted.     Rationale: decrease pain, increase tissue extensibility and decrease trigger points  to improve the patients ability to heavy lifting, pulling, pushing. With   [x] TE   [] TA   [] neuro   [] other: Patient Education: [x] Review HEP    [] Progressed/Changed HEP based on:   [] positioning   [] body mechanics   [] transfers   [] heat/ice application    [] other:        Pain Level (0-10 scale) post treatment: 0     ASSESSMENT/Changes in Function:   Pt doing well with gradual return to work, inc. From 5 hr to 6 hr this week and working 4 days in a row this week, 1 day off and then the weekend. Pt did well with progression in ther ex. Today. Patient will continue to benefit from skilled PT services to modify and progress therapeutic interventions, address functional mobility deficits, address ROM deficits, address strength deficits, analyze and address soft tissue restrictions, assess and modify postural abnormalities and instruct in home and community integration to attain remaining goals. Short Term Goals: To be accomplished in 2-3 weeks:              1) Pt independent in HEP from day 1 MET               2) Pt will report he is using a pillow while sitting to support L UE and supporting low back while sitting at home to decrease pain and strain to L neck and L UE.  MET               3) Pt will report he has initiated walking program at least 10 ' 2 x /week to build endurance and for gentle ROM. MET    Long Term Goals: To be accomplished in 4-8 weeks:              1) Pt independent in final HEP.                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 Re-eval next visit.       Chelsea Tam, PT,  8/16/2022

## 2022-08-23 ENCOUNTER — HOSPITAL ENCOUNTER (OUTPATIENT)
Dept: PHYSICAL THERAPY | Age: 47
Discharge: HOME | End: 2022-08-23
Payer: COMMERCIAL

## 2022-08-23 PROCEDURE — 97110 THERAPEUTIC EXERCISES: CPT | Performed by: PHYSICAL THERAPIST
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
## 2022-08-29 ENCOUNTER — HOSPITAL ENCOUNTER (OUTPATIENT)
Dept: PHYSICAL THERAPY | Age: 47
Discharge: HOME | End: 2022-08-29
Payer: COMMERCIAL

## 2022-08-29 PROCEDURE — 97110 THERAPEUTIC EXERCISES: CPT | Performed by: PHYSICAL THERAPIST

## 2022-08-29 NOTE — PROGRESS NOTES
PT  DAILY TREATMENT NOTE - [facility] 2-15    Patient Name: Atr Shaffer  Date:2022  : 1975  [x]  Patient  Verified  Payor: Piper Shaw / Plan: Genemelida Seals / Product Type: PPO /    In time: 830 AM  Out time: 598  AM   Total Treatment Time (min):  45  Total Timed Codes (min): 45  1:1 Treatment Time [facility] only):45      Visit #: 23    Treatment Area: Lower back pain [M54.50]  Pain in neck [M54.2]  Left shoulder pain [M25.512]  Left arm pain [M79.602]    SUBJECTIVE  Pain Level (0-10 scale), subjective functional status/changes:  0/10 pain. Pt reports going back to work 7 hr has been good. He has been able to climb ladders but is still on restrictions for lifting (  no more than 10 lb). Pt reports tonight he will go back to work 8 hours. Sees Dr. Monae Chappell . Any medication changes, allergies to medications, adverse drug reactions, diagnosis change, or new procedure performed?: [x] No    [] Yes (see summary sheet for update) SEE ABOVE. OBJECTIVE          - min Modality:      []  Ice     [x]  Heat       Position/location:  Supine, back   Rationale: decrease pain to improve the patients ability to do functional activities   [x] Skin assessment post-treatment:  [x]intact []redness- no adverse reaction    []redness - adverse reaction:      45 min Therapeutic Exercise:  [x] See flow sheet : progressed: per flow sheet      Rationale: increase strength, improve coordination and increase proprioception to improve the patients ability to heavy lifting, pulling pushing. decl. min Manual Therapy:     Rationale: decrease pain, increase tissue extensibility and decrease trigger points  to improve the patients ability to heavy lifting, pulling, pushing.             With   [x] TE   [] TA   [] neuro   [] other: Patient Education: [x] Review HEP    [] Progressed/Changed HEP based on:   [] positioning   [] body mechanics   [] transfers   [] heat/ice application    [] other: Pain Level (0-10 scale) post treatment: 0     ASSESSMENT/Changes in Function:   Pt did well with return to work to 7 hours and is now planning to return to work for 8 hours. Pt did well with increasing resistance in session as well. Patient will continue to benefit from skilled PT services to modify and progress therapeutic interventions, address functional mobility deficits, address ROM deficits, address strength deficits, analyze and address soft tissue restrictions, assess and modify postural abnormalities and instruct in home and community integration to attain remaining goals. Short Term Goals: To be accomplished in 2-3 weeks:              1) Pt independent in HEP from day 1 MET               2) Pt will report he is using a pillow while sitting to support L UE and supporting low back while sitting at home to decrease pain and strain to L neck and L UE.  MET               3) Pt will report he has initiated walking program at least 10 ' 2 x /week to build endurance and for gentle ROM. MET    Long Term Goals: To be accomplished in 4-8 weeks:              1) Pt independent in final HEP. 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     Chelsea Tam, PT,  8/29/2022

## 2022-09-01 ENCOUNTER — HOSPITAL ENCOUNTER (OUTPATIENT)
Dept: PHYSICAL THERAPY | Age: 47
Discharge: HOME | End: 2022-09-01
Payer: COMMERCIAL

## 2022-09-01 PROCEDURE — 97110 THERAPEUTIC EXERCISES: CPT | Performed by: PHYSICAL THERAPIST
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
## 2022-09-05 ENCOUNTER — APPOINTMENT (OUTPATIENT)
Dept: PHYSICAL THERAPY | Age: 47
End: 2022-09-05
Payer: COMMERCIAL

## 2023-01-09 ENCOUNTER — OFFICE VISIT (OUTPATIENT)
Dept: INTERNAL MEDICINE CLINIC | Age: 48
End: 2023-01-09
Payer: COMMERCIAL

## 2023-01-09 VITALS
BODY MASS INDEX: 21.3 KG/M2 | OXYGEN SATURATION: 99 % | RESPIRATION RATE: 16 BRPM | HEIGHT: 67 IN | HEART RATE: 68 BPM | DIASTOLIC BLOOD PRESSURE: 85 MMHG | SYSTOLIC BLOOD PRESSURE: 132 MMHG | TEMPERATURE: 98.7 F | WEIGHT: 135.7 LBS

## 2023-01-09 DIAGNOSIS — Z72.0 TOBACCO ABUSE: Primary | ICD-10-CM

## 2023-01-09 DIAGNOSIS — E78.5 DYSLIPIDEMIA: ICD-10-CM

## 2023-01-09 DIAGNOSIS — M21.612 BUNION OF GREAT TOE OF LEFT FOOT: ICD-10-CM

## 2023-01-09 DIAGNOSIS — Z12.11 COLON CANCER SCREENING: ICD-10-CM

## 2023-01-09 DIAGNOSIS — N40.0 BENIGN PROSTATIC HYPERPLASIA WITHOUT LOWER URINARY TRACT SYMPTOMS: ICD-10-CM

## 2023-01-09 DIAGNOSIS — Z00.00 PHYSICAL EXAM: ICD-10-CM

## 2023-01-09 LAB
ALBUMIN SERPL-MCNC: 4 G/DL (ref 3.5–5)
ALBUMIN/GLOB SERPL: 1.3 (ref 1.1–2.2)
ALP SERPL-CCNC: 78 U/L (ref 45–117)
ALT SERPL-CCNC: 31 U/L (ref 12–78)
ANION GAP SERPL CALC-SCNC: 6 MMOL/L (ref 5–15)
APPEARANCE UR: CLEAR
AST SERPL-CCNC: 29 U/L (ref 15–37)
BASOPHILS # BLD: 0.1 K/UL (ref 0–0.1)
BASOPHILS NFR BLD: 1 % (ref 0–1)
BILIRUB SERPL-MCNC: 0.4 MG/DL (ref 0.2–1)
BILIRUB UR QL: NEGATIVE
BUN SERPL-MCNC: 18 MG/DL (ref 6–20)
BUN/CREAT SERPL: 18 (ref 12–20)
CALCIUM SERPL-MCNC: 9.2 MG/DL (ref 8.5–10.1)
CHLORIDE SERPL-SCNC: 107 MMOL/L (ref 97–108)
CHOLEST SERPL-MCNC: 148 MG/DL
CO2 SERPL-SCNC: 27 MMOL/L (ref 21–32)
COLOR UR: ABNORMAL
CREAT SERPL-MCNC: 1.01 MG/DL (ref 0.7–1.3)
CRP SERPL HS-MCNC: 0.8 MG/L
DIFFERENTIAL METHOD BLD: ABNORMAL
EOSINOPHIL # BLD: 0.1 K/UL (ref 0–0.4)
EOSINOPHIL NFR BLD: 1 % (ref 0–7)
ERYTHROCYTE [DISTWIDTH] IN BLOOD BY AUTOMATED COUNT: 13.3 % (ref 11.5–14.5)
GLOBULIN SER CALC-MCNC: 3 G/DL (ref 2–4)
GLUCOSE SERPL-MCNC: 72 MG/DL (ref 65–100)
GLUCOSE UR STRIP.AUTO-MCNC: NEGATIVE MG/DL
HCT VFR BLD AUTO: 39.3 % (ref 36.6–50.3)
HDLC SERPL-MCNC: 73 MG/DL
HDLC SERPL: 2 (ref 0–5)
HGB BLD-MCNC: 13.3 G/DL (ref 12.1–17)
HGB UR QL STRIP: ABNORMAL
IMM GRANULOCYTES # BLD AUTO: 0 K/UL (ref 0–0.04)
IMM GRANULOCYTES NFR BLD AUTO: 0 % (ref 0–0.5)
KETONES UR QL STRIP.AUTO: 40 MG/DL
LDLC SERPL CALC-MCNC: 60.2 MG/DL (ref 0–100)
LEUKOCYTE ESTERASE UR QL STRIP.AUTO: NEGATIVE
LYMPHOCYTES # BLD: 3.9 K/UL (ref 0.8–3.5)
LYMPHOCYTES NFR BLD: 42 % (ref 12–49)
MCH RBC QN AUTO: 32 PG (ref 26–34)
MCHC RBC AUTO-ENTMCNC: 33.8 G/DL (ref 30–36.5)
MCV RBC AUTO: 94.7 FL (ref 80–99)
MONOCYTES # BLD: 1 K/UL (ref 0–1)
MONOCYTES NFR BLD: 11 % (ref 5–13)
NEUTS SEG # BLD: 4.3 K/UL (ref 1.8–8)
NEUTS SEG NFR BLD: 45 % (ref 32–75)
NITRITE UR QL STRIP.AUTO: NEGATIVE
NRBC # BLD: 0 K/UL (ref 0–0.01)
NRBC BLD-RTO: 0 PER 100 WBC
PH UR STRIP: 5.5 (ref 5–8)
PLATELET # BLD AUTO: 250 K/UL (ref 150–400)
PMV BLD AUTO: 10.5 FL (ref 8.9–12.9)
POTASSIUM SERPL-SCNC: 3.8 MMOL/L (ref 3.5–5.1)
PROT SERPL-MCNC: 7 G/DL (ref 6.4–8.2)
PROT UR STRIP-MCNC: ABNORMAL MG/DL
PSA SERPL-MCNC: 0.4 NG/ML (ref 0.01–4)
RBC # BLD AUTO: 4.15 M/UL (ref 4.1–5.7)
SODIUM SERPL-SCNC: 140 MMOL/L (ref 136–145)
SP GR UR REFRACTOMETRY: 1.03 (ref 1–1.03)
TRIGL SERPL-MCNC: 74 MG/DL (ref ?–150)
UROBILINOGEN UR QL STRIP.AUTO: 0.2 EU/DL (ref 0.2–1)
VLDLC SERPL CALC-MCNC: 14.8 MG/DL
WBC # BLD AUTO: 9.4 K/UL (ref 4.1–11.1)

## 2023-01-09 NOTE — PROGRESS NOTES
[address] [place] [address] and Primary Care  [place] [address]  [address] [place] [address]  Phone:  [phone]  Fax: [phone]       Chief Complaint   Patient presents with    Foot Pain     Patient here for left foot pain for two months. .      SUBJECTIVE:    Allison Correia is a 49 y.o. male comes in for yearly physical.  He complains about a protrusion on his left foot. He has no further musculoskeletal problems. Unfortunately he does indeed smoke cigarettes. He wishes to have a colonoscopy. Current Outpatient Medications   Medication Sig Dispense Refill    ibuprofen (MOTRIN) 800 mg tablet Take 800 mg by mouth every six (6) hours as needed for Pain. (Patient not taking: Reported on 1/9/2023)      naproxen (NAPROSYN) 500 mg tablet Take 500 mg by mouth two (2) times daily (with meals).  (Patient not taking: Reported on 1/9/2023)       Past Medical History:   Diagnosis Date    Bronchitis     Hematuria      Past Surgical History:   Procedure Laterality Date    HX ADENOIDECTOMY  1978    HX HERNIA REPAIR  1979    HX TONSILLECTOMY  1978     No Known Allergies      REVIEW OF SYSTEMS:  General: negative for - chills or fever  ENT: negative for - headaches, nasal congestion or tinnitus  Respiratory: negative for - cough, hemoptysis, shortness of breath or wheezing  Cardiovascular : negative for - chest pain, edema, palpitations or shortness of breath  Gastrointestinal: negative for - abdominal pain, blood in stools, heartburn or nausea/vomiting  Genito-Urinary: no dysuria, trouble voiding, or hematuria  Musculoskeletal: negative for - gait disturbance, joint pain, joint stiffness or joint swelling  Neurological: no TIA or stroke symptoms  Hematologic: no bruises, no bleeding, no swollen glands  Integument: no lumps, mole changes, nail changes or rash  Endocrine: no malaise/lethargy or unexpected weight changes      Social History     Socioeconomic History    Marital status: SINGLE   Tobacco Use Smoking status: Some Days     Years: 25.00     Types: Cigarettes    Smokeless tobacco: Never    Tobacco comments:     smokes cigars maybe one or two aday   Vaping Use    Vaping Use: Never used   Substance and Sexual Activity    Alcohol use: Yes     Alcohol/week: 4.0 standard drinks     Types: 2 Cans of beer, 2 Shots of liquor per week     Comment: occ    Drug use: Yes     Types: Marijuana     Comment: QUIT 1996    Sexual activity: Yes     Partners: Female     Family History   Problem Relation Age of Onset    Hypertension Mother     Anesth Problems Neg Hx        OBJECTIVE:    Visit Vitals  /85   Pulse 68   Temp 98.7 °F (37.1 °C) (Oral)   Resp 16   Ht 5' 7\" (1.702 m)   Wt 135 lb 11.2 oz (61.6 kg)   SpO2 99%   BMI 21.25 kg/m²     CONSTITUTIONAL: well , well nourished, appears age appropriate  EYES: perrla, eom intact  ENMT:moist mucous membranes, pharynx clear  NECK: supple. Thyroid normal  RESPIRATORY: Chest: clear to ascultation and percussion   CARDIOVASCULAR: Heart: regular rate and rhythm  GASTROINTESTINAL: Abdomen: soft, bowel sounds active  HEMATOLOGIC: no pathological lymph nodes palpated  MUSCULOSKELETAL: Extremities: no edema, pulse 1+   INTEGUMENT: No unusual rashes or suspicious skin lesions noted. Nails appear normal.  NEUROLOGIC: non-focal exam   MENTAL STATUS: alert and oriented, appropriate affect      ASSESSMENT:  1. Tobacco abuse    2. Bunion of great toe of left foot    3. Colon cancer screening    4. Benign prostatic hyperplasia without lower urinary tract symptoms    5. Dyslipidemia    6. Physical exam        PLAN:  1. I encouraged the patient to discontinue cigarette smoking. 2. He has a bunion on the great toe of the left foot. Nothing needs to be done for now. He is not interested in surgical intervention.   3. For his colon cancer screening, he will be sent to gastroenterologist.  4. His overall cardiovascular risk is gradually increasing particularly in view of the existing comorbidities and gradual increasing age. Orders Placed This Encounter    APOLIPOPROTEIN B    CBC WITH AUTOMATED DIFF    CRP, HIGH SENSITIVITY    LIPID PANEL    METABOLIC PANEL, COMPREHENSIVE    PROSTATE SPECIFIC AG    URINALYSIS W/ RFLX MICROSCOPIC    REFERRAL TO GASTROENTEROLOGY         Follow-up and Dispositions    Return in about 1 year (around 1/9/2024).            Jaymie Mckeon MD

## 2023-01-09 NOTE — PROGRESS NOTES
Surjit Mccurdy is a 49 y.o. male  Chief Complaint   Patient presents with    Foot Pain     Patient here for left foot pain for two months. 1. Have you been to the ER, urgent care clinic since your last visit? Hospitalized since your last visit? No    2. Have you seen or consulted any other health care providers outside of the [address] [place] [address] since your last visit? Include any pap smears or colon screening.  No

## 2023-01-10 LAB — APO B SERPL-MCNC: 55 MG/DL

## 2023-02-08 PROBLEM — Z12.11 COLON CANCER SCREENING: Status: RESOLVED | Noted: 2023-01-09 | Resolved: 2023-02-08

## 2023-05-18 LAB
CHOLEST SERPL-MCNC: 156 MG/DL
GLUCOSE SERPL-MCNC: 94 MG/DL (ref 65–100)
HDLC SERPL-MCNC: 63 MG/DL
LDLC SERPL CALC-MCNC: 51.8 MG/DL (ref 0–100)
TRIGL SERPL-MCNC: 206 MG/DL (ref ?–150)

## 2024-02-28 ENCOUNTER — OFFICE VISIT (OUTPATIENT)
Facility: CLINIC | Age: 49
End: 2024-02-28
Payer: COMMERCIAL

## 2024-02-28 VITALS
HEIGHT: 68 IN | BODY MASS INDEX: 20.9 KG/M2 | SYSTOLIC BLOOD PRESSURE: 126 MMHG | HEART RATE: 72 BPM | RESPIRATION RATE: 16 BRPM | DIASTOLIC BLOOD PRESSURE: 82 MMHG | WEIGHT: 137.9 LBS | TEMPERATURE: 98 F | OXYGEN SATURATION: 97 %

## 2024-02-28 DIAGNOSIS — Z72.0 TOBACCO ABUSE: ICD-10-CM

## 2024-02-28 DIAGNOSIS — Z00.00 PHYSICAL EXAM: Primary | ICD-10-CM

## 2024-02-28 DIAGNOSIS — Z12.5 SPECIAL SCREENING FOR MALIGNANT NEOPLASM OF PROSTATE: ICD-10-CM

## 2024-02-28 DIAGNOSIS — Z12.11 COLON CANCER SCREENING: ICD-10-CM

## 2024-02-28 DIAGNOSIS — M70.61 TROCHANTERIC BURSITIS OF RIGHT HIP: ICD-10-CM

## 2024-02-28 DIAGNOSIS — E78.5 DYSLIPIDEMIA: ICD-10-CM

## 2024-02-28 LAB
ALBUMIN SERPL-MCNC: 4.2 G/DL (ref 3.5–5)
ALBUMIN/GLOB SERPL: 1.6 (ref 1.1–2.2)
ALP SERPL-CCNC: 80 U/L (ref 45–117)
ALT SERPL-CCNC: 37 U/L (ref 12–78)
ANION GAP SERPL CALC-SCNC: 3 MMOL/L (ref 5–15)
APPEARANCE UR: CLEAR
AST SERPL-CCNC: 32 U/L (ref 15–37)
BACTERIA URNS QL MICRO: NEGATIVE /HPF
BASOPHILS # BLD: 0 K/UL (ref 0–0.1)
BASOPHILS NFR BLD: 1 % (ref 0–1)
BILIRUB SERPL-MCNC: 0.4 MG/DL (ref 0.2–1)
BILIRUB UR QL: NEGATIVE
BUN SERPL-MCNC: 9 MG/DL (ref 6–20)
BUN/CREAT SERPL: 9 (ref 12–20)
CALCIUM SERPL-MCNC: 9.2 MG/DL (ref 8.5–10.1)
CHLORIDE SERPL-SCNC: 107 MMOL/L (ref 97–108)
CHOLEST SERPL-MCNC: 178 MG/DL
CO2 SERPL-SCNC: 28 MMOL/L (ref 21–32)
COLOR UR: ABNORMAL
CREAT SERPL-MCNC: 0.97 MG/DL (ref 0.7–1.3)
CRP SERPL HS-MCNC: 0.5 MG/L
DIFFERENTIAL METHOD BLD: NORMAL
EOSINOPHIL # BLD: 0.1 K/UL (ref 0–0.4)
EOSINOPHIL NFR BLD: 1 % (ref 0–7)
EPITH CASTS URNS QL MICRO: ABNORMAL /LPF
ERYTHROCYTE [DISTWIDTH] IN BLOOD BY AUTOMATED COUNT: 13.2 % (ref 11.5–14.5)
GLOBULIN SER CALC-MCNC: 2.7 G/DL (ref 2–4)
GLUCOSE SERPL-MCNC: 89 MG/DL (ref 65–100)
GLUCOSE UR STRIP.AUTO-MCNC: NEGATIVE MG/DL
HCT VFR BLD AUTO: 41.2 % (ref 36.6–50.3)
HDLC SERPL-MCNC: 76 MG/DL
HDLC SERPL: 2.3 (ref 0–5)
HGB BLD-MCNC: 13.8 G/DL (ref 12.1–17)
HGB UR QL STRIP: ABNORMAL
HYALINE CASTS URNS QL MICRO: ABNORMAL /LPF (ref 0–5)
IMM GRANULOCYTES # BLD AUTO: 0 K/UL (ref 0–0.04)
IMM GRANULOCYTES NFR BLD AUTO: 0 % (ref 0–0.5)
KETONES UR QL STRIP.AUTO: NEGATIVE MG/DL
LDLC SERPL CALC-MCNC: 90 MG/DL (ref 0–100)
LEUKOCYTE ESTERASE UR QL STRIP.AUTO: NEGATIVE
LYMPHOCYTES # BLD: 1.8 K/UL (ref 0.8–3.5)
LYMPHOCYTES NFR BLD: 31 % (ref 12–49)
MCH RBC QN AUTO: 31.8 PG (ref 26–34)
MCHC RBC AUTO-ENTMCNC: 33.5 G/DL (ref 30–36.5)
MCV RBC AUTO: 94.9 FL (ref 80–99)
MONOCYTES # BLD: 0.7 K/UL (ref 0–1)
MONOCYTES NFR BLD: 12 % (ref 5–13)
NEUTS SEG # BLD: 3.3 K/UL (ref 1.8–8)
NEUTS SEG NFR BLD: 55 % (ref 32–75)
NITRITE UR QL STRIP.AUTO: NEGATIVE
NRBC # BLD: 0 K/UL (ref 0–0.01)
NRBC BLD-RTO: 0 PER 100 WBC
PH UR STRIP: 7 (ref 5–8)
PLATELET # BLD AUTO: 267 K/UL (ref 150–400)
PMV BLD AUTO: 10.8 FL (ref 8.9–12.9)
POTASSIUM SERPL-SCNC: 4.2 MMOL/L (ref 3.5–5.1)
PROT SERPL-MCNC: 6.9 G/DL (ref 6.4–8.2)
PROT UR STRIP-MCNC: NEGATIVE MG/DL
PSA SERPL-MCNC: 0.4 NG/ML (ref 0.01–4)
RBC # BLD AUTO: 4.34 M/UL (ref 4.1–5.7)
RBC #/AREA URNS HPF: ABNORMAL /HPF (ref 0–5)
SODIUM SERPL-SCNC: 138 MMOL/L (ref 136–145)
SP GR UR REFRACTOMETRY: 1.01 (ref 1–1.03)
TRIGL SERPL-MCNC: 60 MG/DL
UROBILINOGEN UR QL STRIP.AUTO: 0.2 EU/DL (ref 0.2–1)
VLDLC SERPL CALC-MCNC: 12 MG/DL
WBC # BLD AUTO: 5.9 K/UL (ref 4.1–11.1)
WBC URNS QL MICRO: ABNORMAL /HPF (ref 0–4)

## 2024-02-28 PROCEDURE — 99396 PREV VISIT EST AGE 40-64: CPT | Performed by: INTERNAL MEDICINE

## 2024-02-28 PROCEDURE — 99214 OFFICE O/P EST MOD 30 MIN: CPT | Performed by: INTERNAL MEDICINE

## 2024-02-28 SDOH — ECONOMIC STABILITY: INCOME INSECURITY: HOW HARD IS IT FOR YOU TO PAY FOR THE VERY BASICS LIKE FOOD, HOUSING, MEDICAL CARE, AND HEATING?: NOT HARD AT ALL

## 2024-02-28 SDOH — ECONOMIC STABILITY: HOUSING INSECURITY
IN THE LAST 12 MONTHS, WAS THERE A TIME WHEN YOU DID NOT HAVE A STEADY PLACE TO SLEEP OR SLEPT IN A SHELTER (INCLUDING NOW)?: NO

## 2024-02-28 SDOH — ECONOMIC STABILITY: FOOD INSECURITY: WITHIN THE PAST 12 MONTHS, THE FOOD YOU BOUGHT JUST DIDN'T LAST AND YOU DIDN'T HAVE MONEY TO GET MORE.: NEVER TRUE

## 2024-02-28 SDOH — ECONOMIC STABILITY: FOOD INSECURITY: WITHIN THE PAST 12 MONTHS, YOU WORRIED THAT YOUR FOOD WOULD RUN OUT BEFORE YOU GOT MONEY TO BUY MORE.: NEVER TRUE

## 2024-02-28 ASSESSMENT — PATIENT HEALTH QUESTIONNAIRE - PHQ9
SUM OF ALL RESPONSES TO PHQ QUESTIONS 1-9: 0
SUM OF ALL RESPONSES TO PHQ QUESTIONS 1-9: 0
2. FEELING DOWN, DEPRESSED OR HOPELESS: 0
SUM OF ALL RESPONSES TO PHQ QUESTIONS 1-9: 0
SUM OF ALL RESPONSES TO PHQ QUESTIONS 1-9: 0
SUM OF ALL RESPONSES TO PHQ9 QUESTIONS 1 & 2: 0
1. LITTLE INTEREST OR PLEASURE IN DOING THINGS: 0

## 2024-02-28 ASSESSMENT — ANXIETY QUESTIONNAIRES
5. BEING SO RESTLESS THAT IT IS HARD TO SIT STILL: 0
7. FEELING AFRAID AS IF SOMETHING AWFUL MIGHT HAPPEN: 0
2. NOT BEING ABLE TO STOP OR CONTROL WORRYING: 0
IF YOU CHECKED OFF ANY PROBLEMS ON THIS QUESTIONNAIRE, HOW DIFFICULT HAVE THESE PROBLEMS MADE IT FOR YOU TO DO YOUR WORK, TAKE CARE OF THINGS AT HOME, OR GET ALONG WITH OTHER PEOPLE: NOT DIFFICULT AT ALL
GAD7 TOTAL SCORE: 0
3. WORRYING TOO MUCH ABOUT DIFFERENT THINGS: 0
1. FEELING NERVOUS, ANXIOUS, OR ON EDGE: 0
6. BECOMING EASILY ANNOYED OR IRRITABLE: 0
4. TROUBLE RELAXING: 0
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
## 2024-02-28 NOTE — PROGRESS NOTES
Chief Complaint   Patient presents with    Annual Exam   Patient states \"right hip pain from time to time\"  \"Have you been to the ER, urgent care clinic since your last visit?  Hospitalized since your last visit?\"    NO    “Have you seen or consulted any other health care providers outside of [facility] since your last visit?”    NO    “Have you had a colorectal cancer screening such as a colonoscopy/FIT/Cologuard?    NO

## 2024-03-01 LAB — APO B SERPL-MCNC: 80 MG/DL

## 2024-03-05 RX ORDER — MELOXICAM 15 MG/1
15 TABLET ORAL DAILY
Qty: 30 TABLET | Refills: 2 | Status: SHIPPED | OUTPATIENT
Start: 2024-03-05

## 2024-03-07 RX ORDER — ALBUTEROL SULFATE 90 UG/1
2 AEROSOL, METERED RESPIRATORY (INHALATION) 4 TIMES DAILY PRN
Qty: 18 G | Refills: 11 | Status: SHIPPED | OUTPATIENT
Start: 2024-03-07

## 2024-03-22 ENCOUNTER — TELEPHONE (OUTPATIENT)
Facility: CLINIC | Age: 49
End: 2024-03-22

## 2024-03-22 DIAGNOSIS — N30.01 ACUTE CYSTITIS WITH HEMATURIA: Primary | ICD-10-CM

## 2024-03-22 RX ORDER — CEFUROXIME AXETIL 250 MG/1
250 TABLET ORAL 2 TIMES DAILY
Qty: 14 TABLET | Refills: 0 | Status: SHIPPED | OUTPATIENT
Start: 2024-03-22 | End: 2024-03-29
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
## 2024-03-22 NOTE — TELEPHONE ENCOUNTER
Message left on patient phone that he has a uti and a rx has been sent to his pharmacy. We ask that he return for repeat urine after meds completed

## 2024-04-09 ENCOUNTER — NURSE ONLY (OUTPATIENT)
Facility: CLINIC | Age: 49
End: 2024-04-09

## 2024-04-09 DIAGNOSIS — R31.9 URINARY TRACT INFECTION WITH HEMATURIA, SITE UNSPECIFIED: Primary | ICD-10-CM

## 2024-04-09 DIAGNOSIS — N39.0 URINARY TRACT INFECTION WITH HEMATURIA, SITE UNSPECIFIED: Primary | ICD-10-CM

## 2024-06-13 LAB
CHOLEST SERPL-MCNC: 140 MG/DL
GLUCOSE SERPL-MCNC: 83 MG/DL (ref 65–100)
HDLC SERPL-MCNC: 71 MG/DL
LDLC SERPL CALC-MCNC: 58 MG/DL (ref 0–100)
TRIGL SERPL-MCNC: 55 MG/DL

## 2025-03-15 ENCOUNTER — OFFICE VISIT (OUTPATIENT)
Age: 50
End: 2025-03-15

## 2025-03-15 VITALS
OXYGEN SATURATION: 96 % | SYSTOLIC BLOOD PRESSURE: 134 MMHG | RESPIRATION RATE: 16 BRPM | HEART RATE: 69 BPM | WEIGHT: 135 LBS | TEMPERATURE: 98.4 F | BODY MASS INDEX: 20.53 KG/M2 | DIASTOLIC BLOOD PRESSURE: 82 MMHG

## 2025-03-15 DIAGNOSIS — J06.9 VIRAL URI: Primary | ICD-10-CM

## 2025-03-15 DIAGNOSIS — R09.81 CONGESTION OF NASAL SINUS: ICD-10-CM

## 2025-03-15 LAB
Lab: NORMAL
PERFORMING INSTRUMENT: NORMAL
QC PASS/FAIL: NORMAL
SARS-COV-2, POC: NORMAL

## 2025-03-15 RX ORDER — OLOPATADINE HYDROCHLORIDE 2 MG/ML
1 SOLUTION/ DROPS OPHTHALMIC DAILY PRN
Qty: 2.5 ML | Refills: 0 | Status: SHIPPED | OUTPATIENT
Start: 2025-03-15

## 2025-03-15 RX ORDER — FLUTICASONE PROPIONATE 50 MCG
2 SPRAY, SUSPENSION (ML) NASAL DAILY
Qty: 16 G | Refills: 0 | Status: SHIPPED | OUTPATIENT
Start: 2025-03-15

## 2025-03-15 RX ORDER — CETIRIZINE HYDROCHLORIDE 10 MG/1
10 TABLET ORAL DAILY
Qty: 30 TABLET | Refills: 0 | Status: SHIPPED | OUTPATIENT
Start: 2025-03-15

## 2025-03-15 NOTE — PROGRESS NOTES
Patient Name: Sourav Gamino   YOB: 1975   Patient Status: Established patient,   Chief Complaint: Congestion (Presents today with c/o nasal and head congestion x3 days. Reports yellow mucus production when blowing nose. Runny and swollen eyes noted. Reports using a saline flush yesterday for temporary relief. )      ____________________________________________________________________________________________    External Records Reviewed: None    Limitation to History: None    Outside Historian: None    SUBJECTIVE/OBJECTIVE:  Sourav Gamino is a 49 y.o. male presents with complaint of nasal congestion with yellow mucous, sneezing, runny nose and water eyes.  Symptoms began 3 day(s) ago and show no change since onset.  The patient denies fever, shortness of breath, chest pain, and GI Symptoms . Patient reports taking saline spray for symptoms without relief. No other acute symptoms reported at this time. 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 Patient is a daily smoker.          PAST MEDICAL HISTORY:   Medical: Pt  has a past medical history of Bronchitis and Hematuria.  Surgical: Pt  has a past surgical history that includes Adenoidectomy (1978); Tonsillectomy (1978); and hernia repair (1979).  Family: Pt family history includes Hypertension in his mother.  Social: Pt   Social History     Socioeconomic History    Marital status: Single     Spouse name: Not on file    Number of children: Not on file    Years of education: Not on file    Highest education level: Not on file   Occupational History    Not on file   Tobacco Use    Smoking status: Some Days     Current packs/day: 0.10     Average packs/day: 0.1 packs/day for 25.3 years (2.5 ttl pk-yrs)     Types: Cigarettes     Start date: 12/1999    Smokeless tobacco: Never   Substance and Sexual Activity    Alcohol use: Yes     Alcohol/week: 4.0 standard drinks of alcohol    Drug use: Yes     Types: Marijuana (Weed)    Sexual activity: Not on

## 2025-04-21 ENCOUNTER — OFFICE VISIT (OUTPATIENT)
Facility: CLINIC | Age: 50
End: 2025-04-21
Payer: COMMERCIAL

## 2025-04-21 VITALS
DIASTOLIC BLOOD PRESSURE: 89 MMHG | WEIGHT: 137 LBS | BODY MASS INDEX: 20.76 KG/M2 | HEART RATE: 61 BPM | OXYGEN SATURATION: 98 % | SYSTOLIC BLOOD PRESSURE: 130 MMHG | HEIGHT: 68 IN | RESPIRATION RATE: 18 BRPM | TEMPERATURE: 98 F

## 2025-04-21 DIAGNOSIS — Z00.00 PHYSICAL EXAM: Primary | ICD-10-CM

## 2025-04-21 DIAGNOSIS — Z12.5 SPECIAL SCREENING FOR MALIGNANT NEOPLASM OF PROSTATE: ICD-10-CM

## 2025-04-21 DIAGNOSIS — Z12.11 COLON CANCER SCREENING: ICD-10-CM

## 2025-04-21 DIAGNOSIS — R31.29 MICROSCOPIC HEMATURIA: ICD-10-CM

## 2025-04-21 DIAGNOSIS — E78.5 DYSLIPIDEMIA: ICD-10-CM

## 2025-04-21 LAB
ALBUMIN SERPL-MCNC: 4.2 G/DL (ref 3.5–5)
ALBUMIN/GLOB SERPL: 1.4 (ref 1.1–2.2)
ALP SERPL-CCNC: 87 U/L (ref 45–117)
ALT SERPL-CCNC: 46 U/L (ref 12–78)
AMORPH CRY URNS QL MICRO: ABNORMAL
ANION GAP SERPL CALC-SCNC: 3 MMOL/L (ref 2–12)
APPEARANCE UR: ABNORMAL
AST SERPL-CCNC: 36 U/L (ref 15–37)
BACTERIA URNS QL MICRO: ABNORMAL /HPF
BASOPHILS # BLD: 0.06 K/UL (ref 0–0.1)
BASOPHILS NFR BLD: 0.8 % (ref 0–1)
BILIRUB SERPL-MCNC: 0.5 MG/DL (ref 0.2–1)
BILIRUB UR QL: NEGATIVE
BUN SERPL-MCNC: 16 MG/DL (ref 6–20)
BUN/CREAT SERPL: 16 (ref 12–20)
CALCIUM SERPL-MCNC: 9.7 MG/DL (ref 8.5–10.1)
CHLORIDE SERPL-SCNC: 105 MMOL/L (ref 97–108)
CHOLEST SERPL-MCNC: 197 MG/DL
CO2 SERPL-SCNC: 29 MMOL/L (ref 21–32)
COLOR UR: ABNORMAL
CREAT SERPL-MCNC: 1 MG/DL (ref 0.7–1.3)
CRP SERPL HS-MCNC: 1.1 MG/L
DIFFERENTIAL METHOD BLD: NORMAL
EOSINOPHIL # BLD: 0.1 K/UL (ref 0–0.4)
EOSINOPHIL NFR BLD: 1.3 % (ref 0–7)
EPITH CASTS URNS QL MICRO: ABNORMAL /LPF
ERYTHROCYTE [DISTWIDTH] IN BLOOD BY AUTOMATED COUNT: 13.4 % (ref 11.5–14.5)
GLOBULIN SER CALC-MCNC: 2.9 G/DL (ref 2–4)
GLUCOSE SERPL-MCNC: 83 MG/DL (ref 65–100)
GLUCOSE UR STRIP.AUTO-MCNC: NEGATIVE MG/DL
HCT VFR BLD AUTO: 41 % (ref 36.6–50.3)
HDLC SERPL-MCNC: 65 MG/DL
HDLC SERPL: 3 (ref 0–5)
HGB BLD-MCNC: 14.2 G/DL (ref 12.1–17)
HGB UR QL STRIP: ABNORMAL
IMM GRANULOCYTES # BLD AUTO: 0.01 K/UL (ref 0–0.04)
IMM GRANULOCYTES NFR BLD AUTO: 0.1 % (ref 0–0.5)
KETONES UR QL STRIP.AUTO: 15 MG/DL
LDLC SERPL CALC-MCNC: 58.2 MG/DL (ref 0–100)
LEUKOCYTE ESTERASE UR QL STRIP.AUTO: NEGATIVE
LYMPHOCYTES # BLD: 2.72 K/UL (ref 0.8–3.5)
LYMPHOCYTES NFR BLD: 35.7 % (ref 12–49)
MCH RBC QN AUTO: 31.6 PG (ref 26–34)
MCHC RBC AUTO-ENTMCNC: 34.6 G/DL (ref 30–36.5)
MCV RBC AUTO: 91.1 FL (ref 80–99)
MONOCYTES # BLD: 0.92 K/UL (ref 0–1)
MONOCYTES NFR BLD: 12.1 % (ref 5–13)
NEUTS SEG # BLD: 3.8 K/UL (ref 1.8–8)
NEUTS SEG NFR BLD: 50 % (ref 32–75)
NITRITE UR QL STRIP.AUTO: NEGATIVE
NRBC # BLD: 0 K/UL (ref 0–0.01)
NRBC BLD-RTO: 0 PER 100 WBC
PH UR STRIP: 5.5 (ref 5–8)
PLATELET # BLD AUTO: 248 K/UL (ref 150–400)
PMV BLD AUTO: 10.5 FL (ref 8.9–12.9)
POTASSIUM SERPL-SCNC: 4 MMOL/L (ref 3.5–5.1)
PROT SERPL-MCNC: 7.1 G/DL (ref 6.4–8.2)
PROT UR STRIP-MCNC: ABNORMAL MG/DL
PSA SERPL-MCNC: 0.5 NG/ML (ref 0.01–4)
RBC # BLD AUTO: 4.5 M/UL (ref 4.1–5.7)
RBC #/AREA URNS HPF: ABNORMAL /HPF (ref 0–5)
SODIUM SERPL-SCNC: 137 MMOL/L (ref 136–145)
SP GR UR REFRACTOMETRY: 1.02 (ref 1–1.03)
TRIGL SERPL-MCNC: 369 MG/DL
UROBILINOGEN UR QL STRIP.AUTO: 1 EU/DL (ref 0.2–1)
VLDLC SERPL CALC-MCNC: 73.8 MG/DL
WBC # BLD AUTO: 7.6 K/UL (ref 4.1–11.1)
WBC URNS QL MICRO: ABNORMAL /HPF (ref 0–4)

## 2025-04-21 PROCEDURE — 99214 OFFICE O/P EST MOD 30 MIN: CPT | Performed by: INTERNAL MEDICINE

## 2025-04-21 PROCEDURE — 99396 PREV VISIT EST AGE 40-64: CPT | Performed by: INTERNAL MEDICINE

## 2025-04-21 SDOH — ECONOMIC STABILITY: FOOD INSECURITY: WITHIN THE PAST 12 MONTHS, THE FOOD YOU BOUGHT JUST DIDN'T LAST AND YOU DIDN'T HAVE MONEY TO GET MORE.: NEVER TRUE

## 2025-04-21 SDOH — ECONOMIC STABILITY: FOOD INSECURITY: WITHIN THE PAST 12 MONTHS, YOU WORRIED THAT YOUR FOOD WOULD RUN OUT BEFORE YOU GOT MONEY TO BUY MORE.: NEVER TRUE

## 2025-04-21 ASSESSMENT — PATIENT HEALTH QUESTIONNAIRE - PHQ9
SUM OF ALL RESPONSES TO PHQ QUESTIONS 1-9: 0
SUM OF ALL RESPONSES TO PHQ QUESTIONS 1-9: 0
2. FEELING DOWN, DEPRESSED OR HOPELESS: NOT AT ALL
SUM OF ALL RESPONSES TO PHQ QUESTIONS 1-9: 0
1. LITTLE INTEREST OR PLEASURE IN DOING THINGS: NOT AT ALL
SUM OF ALL RESPONSES TO PHQ QUESTIONS 1-9: 0

## 2025-04-21 NOTE — PROGRESS NOTES
Chief Complaint   Patient presents with    Annual Exam     Have you been to the ER, urgent care clinic since your last visit?  Hospitalized since your last visit?    YES - When: approximately 1 months ago.  Where and Why: [facility] ED for allergies.    Have you seen or consulted any other health care providers outside our system since your last visit?    NO      “Have you had a colorectal cancer screening such as a colonoscopy/FIT/Cologuard?    NO    No colonoscopy on file  No cologuard on file  No FIT/FOBT on file   No flexible sigmoidoscopy on file

## 2025-04-23 LAB — APO B SERPL-MCNC: 80 MG/DL

## 2025-04-30 NOTE — PROGRESS NOTES
[facility]  [facility]   [address]  [facility] [address]  Phone:  [phone]  Fax: [phone]       Chief Complaint   Patient presents with    Annual Exam   .      SUBJECTIVE:      History of Present Illness  The patient presents for his yearly physical, reporting that he has been generally well. However, he continues to smoke cigarettes, leading to periodic asthma flare-ups. 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 He needs to have a colonoscopy done. He has a history of microscopic hematuria and hopefully this has resolved. If not, he will have to see a urologist.    SOCIAL HISTORY  He continues to smoke cigarettes. 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         Current Outpatient Medications   Medication Sig Dispense Refill    fluticasone (FLONASE) 50 MCG/ACT nasal spray 2 sprays by Each Nostril route daily 16 g 0    cetirizine (ZYRTEC) 10 MG tablet Take 1 tablet by mouth daily 30 tablet 0    olopatadine (PATADAY) 0.2 % SOLN ophthalmic solution Place 1 drop into both eyes daily as needed (watery irritated eyes from allergies) 2.5 mL 0    albuterol sulfate HFA (VENTOLIN HFA) 108 (90 Base) MCG/ACT inhaler Inhale 2 puffs into the lungs 4 times daily as needed for Wheezing 18 g 11    ibuprofen (ADVIL;MOTRIN) 800 MG tablet Take 1 tablet by mouth every 6 hours as needed      meloxicam (MOBIC) 15 MG tablet Take 1 tablet by mouth daily (Patient not taking: Reported on 4/21/2025) 30 tablet 2    naproxen (NAPROSYN) 500 MG tablet Take 1 tablet by mouth 2 times daily (with meals) (Patient not taking: Reported on 4/21/2025)       No current facility-administered medications for this visit.     Past Medical History:   Diagnosis Date    Bronchitis     Hematuria      Past Surgical History:   Procedure Laterality Date    ADENOIDECTOMY  1978    HERNIA REPAIR  1979    TONSILLECTOMY  1978     No Known Allergies      REVIEW OF SYSTEMS:  General:

## 2025-05-29 ENCOUNTER — RESULTS FOLLOW-UP (OUTPATIENT)
Facility: CLINIC | Age: 50
End: 2025-05-29

## 2025-06-03 LAB
CHOLEST SERPL-MCNC: 165 MG/DL
GLUCOSE SERPL-MCNC: 87 MG/DL (ref 65–100)
HDLC SERPL-MCNC: 65 MG/DL
LDLC SERPL CALC-MCNC: 57.8 MG/DL (ref 0–100)
TRIGL SERPL-MCNC: 211 MG/DL

## 2025-08-12 ENCOUNTER — ANESTHESIA (OUTPATIENT)
Facility: HOSPITAL | Age: 50
End: 2025-08-12
Payer: COMMERCIAL

## 2025-08-12 ENCOUNTER — ANESTHESIA EVENT (OUTPATIENT)
Facility: HOSPITAL | Age: 50
End: 2025-08-12
Payer: COMMERCIAL

## 2025-08-12 ENCOUNTER — HOSPITAL ENCOUNTER (OUTPATIENT)
Facility: HOSPITAL | Age: 50
Setting detail: OUTPATIENT SURGERY
Discharge: HOME | End: 2025-08-12
Attending: INTERNAL MEDICINE | Admitting: INTERNAL MEDICINE
Payer: COMMERCIAL

## 2025-08-12 VITALS
SYSTOLIC BLOOD PRESSURE: 126 MMHG | TEMPERATURE: 97.5 F | OXYGEN SATURATION: 98 % | DIASTOLIC BLOOD PRESSURE: 94 MMHG | WEIGHT: 134 LBS | BODY MASS INDEX: 20.31 KG/M2 | RESPIRATION RATE: 11 BRPM | HEIGHT: 68 IN | HEART RATE: 62 BPM

## 2025-08-12 PROCEDURE — 3600007512: Performed by: INTERNAL MEDICINE

## 2025-08-12 PROCEDURE — 7100000010 HC PHASE II RECOVERY - FIRST 15 MIN: Performed by: INTERNAL MEDICINE

## 2025-08-12 PROCEDURE — 3600007502: Performed by: INTERNAL MEDICINE

## 2025-08-12 PROCEDURE — 7100000011 HC PHASE II RECOVERY - ADDTL 15 MIN: Performed by: INTERNAL MEDICINE

## 2025-08-12 PROCEDURE — 88305 TISSUE EXAM BY PATHOLOGIST: CPT

## 2025-08-12 PROCEDURE — 6360000002 HC RX W HCPCS: Performed by: NURSE ANESTHETIST, CERTIFIED REGISTERED

## 2025-08-12 PROCEDURE — 3700000000 HC ANESTHESIA ATTENDED CARE: Performed by: INTERNAL MEDICINE

## 2025-08-12 PROCEDURE — 2709999900 HC NON-CHARGEABLE SUPPLY: Performed by: INTERNAL MEDICINE

## 2025-08-12 PROCEDURE — 3700000001 HC ADD 15 MINUTES (ANESTHESIA): Performed by: INTERNAL MEDICINE

## 2025-08-12 RX ORDER — SODIUM CHLORIDE 0.9 % (FLUSH) 0.9 %
5-40 SYRINGE (ML) INJECTION PRN
Status: DISCONTINUED | OUTPATIENT
Start: 2025-08-12 | End: 2025-08-12 | Stop reason: HOSPADM

## 2025-08-12 RX ORDER — SODIUM CHLORIDE 9 MG/ML
INJECTION, SOLUTION INTRAVENOUS CONTINUOUS
Status: DISCONTINUED | OUTPATIENT
Start: 2025-08-12 | End: 2025-08-12 | Stop reason: HOSPADM

## 2025-08-12 RX ORDER — LIDOCAINE HYDROCHLORIDE 20 MG/ML
INJECTION, SOLUTION EPIDURAL; INFILTRATION; INTRACAUDAL; PERINEURAL
Status: DISCONTINUED | OUTPATIENT
Start: 2025-08-12 | End: 2025-08-12 | Stop reason: SDUPTHER

## 2025-08-12 RX ORDER — SODIUM CHLORIDE 9 MG/ML
INJECTION, SOLUTION INTRAVENOUS PRN
Status: DISCONTINUED | OUTPATIENT
Start: 2025-08-12 | End: 2025-08-12 | Stop reason: HOSPADM

## 2025-08-12 RX ORDER — SODIUM CHLORIDE 0.9 % (FLUSH) 0.9 %
5-40 SYRINGE (ML) INJECTION EVERY 12 HOURS SCHEDULED
Status: DISCONTINUED | OUTPATIENT
Start: 2025-08-12 | End: 2025-08-12 | Stop reason: HOSPADM

## 2025-08-12 RX ADMIN — PROPOFOL 25 MG: 10 INJECTION, EMULSION INTRAVENOUS at 15:58

## 2025-08-12 RX ADMIN — PROPOFOL 25 MG: 10 INJECTION, EMULSION INTRAVENOUS at 15:54

## 2025-08-12 RX ADMIN — PROPOFOL 25 MG: 10 INJECTION, EMULSION INTRAVENOUS at 15:53

## 2025-08-12 RX ADMIN — PROPOFOL 25 MG: 10 INJECTION, EMULSION INTRAVENOUS at 16:05

## 2025-08-12 RX ADMIN — PROPOFOL 25 MG: 10 INJECTION, EMULSION INTRAVENOUS at 15:51

## 2025-08-12 RX ADMIN — PROPOFOL 25 MG: 10 INJECTION, EMULSION INTRAVENOUS at 15:50

## 2025-08-12 RX ADMIN — PROPOFOL 100 MG: 10 INJECTION, EMULSION INTRAVENOUS at 15:49

## 2025-08-12 RX ADMIN — PROPOFOL 25 MG: 10 INJECTION, EMULSION INTRAVENOUS at 16:01

## 2025-08-12 RX ADMIN — PROPOFOL 25 MG: 10 INJECTION, EMULSION INTRAVENOUS at 15:56

## 2025-08-12 RX ADMIN — PROPOFOL 25 MG: 10 INJECTION, EMULSION INTRAVENOUS at 16:10

## 2025-08-12 RX ADMIN — PROPOFOL 25 MG: 10 INJECTION, EMULSION INTRAVENOUS at 15:52

## 2025-08-12 RX ADMIN — LIDOCAINE HYDROCHLORIDE 50 MG: 20 INJECTION, SOLUTION EPIDURAL; INFILTRATION; INTRACAUDAL; PERINEURAL at 15:49

## 2025-08-12 ASSESSMENT — LIFESTYLE VARIABLES: SMOKING_STATUS: 1

## (undated) DEVICE — DEVON™ KNEE AND BODY STRAP 60" X 3" (1.5 M X 7.6 CM): Brand: DEVON

## (undated) DEVICE — STERILE POLYISOPRENE POWDER-FREE SURGICAL GLOVES WITH EMOLLIENT COATING: Brand: PROTEXIS

## (undated) DEVICE — SKIN MARKER,REGULAR TIP WITH RULER AND LABELS: Brand: DEVON

## (undated) DEVICE — ELECTRODE PT RET AD L9FT HI MOIST COND ADH HYDRGEL CORDED

## (undated) DEVICE — JELLY,LUBE,STERILE,FLIP TOP,TUBE,4-OZ: Brand: MEDLINE

## (undated) DEVICE — FORCEPS BX L240CM JAW DIA2.2MM RAD JAW 4 HOT DISP

## (undated) DEVICE — INFECTION CONTROL KIT SYS

## (undated) DEVICE — CYSTO/BLADDER IRRIGATION SET, REGULATING CLAMP

## (undated) DEVICE — SOLUTION IRRIGATION H2O 0797305] ICU MEDICAL INC]

## (undated) DEVICE — CYSTOSCOPY PACK: Brand: CONVERTORS

## (undated) DEVICE — Z DISCONTINUEDSOLUTION PREP 2OZ 10% POVIDONE IOD SCR CAP BTL

## (undated) DEVICE — SOLUTION IRRIG 1000ML H2O STRL BLT

## (undated) DEVICE — GOWN,SIRUS,FABRNF,XL,20/CS: Brand: MEDLINE

## (undated) DEVICE — TIDISHIELD UROLOGY DRAIN BAGS FROSTY VINYL STERILE FITS SIEMENS UROSKOP ACCESS 20 PER CASE: Brand: TIDISHIELD